# Patient Record
Sex: FEMALE | Race: BLACK OR AFRICAN AMERICAN | NOT HISPANIC OR LATINO | Employment: FULL TIME | URBAN - METROPOLITAN AREA
[De-identification: names, ages, dates, MRNs, and addresses within clinical notes are randomized per-mention and may not be internally consistent; named-entity substitution may affect disease eponyms.]

---

## 2023-09-11 ENCOUNTER — HOSPITAL ENCOUNTER (INPATIENT)
Facility: HOSPITAL | Age: 28
LOS: 2 days | Discharge: HOME | End: 2023-09-13
Attending: EMERGENCY MEDICINE | Admitting: STUDENT IN AN ORGANIZED HEALTH CARE EDUCATION/TRAINING PROGRAM
Payer: COMMERCIAL

## 2023-09-11 ENCOUNTER — APPOINTMENT (EMERGENCY)
Dept: CT IMAGING | Facility: HOSPITAL | Age: 28
End: 2023-09-11
Payer: COMMERCIAL

## 2023-09-11 ENCOUNTER — APPOINTMENT (EMERGENCY)
Dept: RADIOLOGY | Facility: HOSPITAL | Age: 28
End: 2023-09-11
Payer: COMMERCIAL

## 2023-09-11 DIAGNOSIS — R31.0 HEMATURIA, GROSS: Primary | ICD-10-CM

## 2023-09-11 DIAGNOSIS — N39.0 UTI (URINARY TRACT INFECTION): ICD-10-CM

## 2023-09-11 DIAGNOSIS — R10.9 ABDOMINAL PAIN: ICD-10-CM

## 2023-09-11 DIAGNOSIS — K59.00 CONSTIPATION: ICD-10-CM

## 2023-09-11 DIAGNOSIS — R14.0 ABDOMINAL DISTENTION: ICD-10-CM

## 2023-09-11 LAB
ALBUMIN SERPL BCP-MCNC: 4.4 G/DL (ref 3.5–5)
ALP SERPL-CCNC: 57 U/L (ref 34–104)
ALT SERPL W P-5'-P-CCNC: 11 U/L (ref 7–52)
ANION GAP SERPL CALCULATED.3IONS-SCNC: 8 MMOL/L
AST SERPL W P-5'-P-CCNC: 12 U/L (ref 13–39)
BACTERIA UR QL AUTO: ABNORMAL /HPF
BASOPHILS # BLD AUTO: 0.02 THOUSANDS/ÂΜL (ref 0–0.1)
BASOPHILS NFR BLD AUTO: 0 % (ref 0–1)
BILIRUB SERPL-MCNC: 0.48 MG/DL (ref 0.2–1)
BILIRUB UR QL STRIP: NEGATIVE
BUN SERPL-MCNC: 11 MG/DL (ref 5–25)
CALCIUM SERPL-MCNC: 9.7 MG/DL (ref 8.4–10.2)
CHLORIDE SERPL-SCNC: 106 MMOL/L (ref 96–108)
CLARITY UR: ABNORMAL
CO2 SERPL-SCNC: 24 MMOL/L (ref 21–32)
COLOR UR: ABNORMAL
CREAT SERPL-MCNC: 0.52 MG/DL (ref 0.6–1.3)
EOSINOPHIL # BLD AUTO: 0.02 THOUSAND/ÂΜL (ref 0–0.61)
EOSINOPHIL NFR BLD AUTO: 0 % (ref 0–6)
ERYTHROCYTE [DISTWIDTH] IN BLOOD BY AUTOMATED COUNT: 12.6 % (ref 11.6–15.1)
EXT PREGNANCY TEST URINE: NEGATIVE
EXT. CONTROL: NORMAL
GFR SERPL CREATININE-BSD FRML MDRD: 131 ML/MIN/1.73SQ M
GLUCOSE SERPL-MCNC: 90 MG/DL (ref 65–140)
GLUCOSE UR STRIP-MCNC: NEGATIVE MG/DL
HCT VFR BLD AUTO: 38.2 % (ref 34.8–46.1)
HGB BLD-MCNC: 12.8 G/DL (ref 11.5–15.4)
HGB UR QL STRIP.AUTO: ABNORMAL
IMM GRANULOCYTES # BLD AUTO: 0.03 THOUSAND/UL (ref 0–0.2)
IMM GRANULOCYTES NFR BLD AUTO: 0 % (ref 0–2)
KETONES UR STRIP-MCNC: ABNORMAL MG/DL
LEUKOCYTE ESTERASE UR QL STRIP: ABNORMAL
LYMPHOCYTES # BLD AUTO: 1.63 THOUSANDS/ÂΜL (ref 0.6–4.47)
LYMPHOCYTES NFR BLD AUTO: 16 % (ref 14–44)
MCH RBC QN AUTO: 30.3 PG (ref 26.8–34.3)
MCHC RBC AUTO-ENTMCNC: 33.5 G/DL (ref 31.4–37.4)
MCV RBC AUTO: 90 FL (ref 82–98)
MONOCYTES # BLD AUTO: 0.41 THOUSAND/ÂΜL (ref 0.17–1.22)
MONOCYTES NFR BLD AUTO: 4 % (ref 4–12)
NEUTROPHILS # BLD AUTO: 8.27 THOUSANDS/ÂΜL (ref 1.85–7.62)
NEUTS SEG NFR BLD AUTO: 80 % (ref 43–75)
NITRITE UR QL STRIP: NEGATIVE
NON-SQ EPI CELLS URNS QL MICRO: ABNORMAL /HPF
NRBC BLD AUTO-RTO: 0 /100 WBCS
PH UR STRIP.AUTO: 6 [PH]
PLATELET # BLD AUTO: 256 THOUSANDS/UL (ref 149–390)
PMV BLD AUTO: 10.1 FL (ref 8.9–12.7)
POTASSIUM SERPL-SCNC: 3.9 MMOL/L (ref 3.5–5.3)
PROT SERPL-MCNC: 7 G/DL (ref 6.4–8.4)
PROT UR STRIP-MCNC: ABNORMAL MG/DL
RBC # BLD AUTO: 4.23 MILLION/UL (ref 3.81–5.12)
RBC #/AREA URNS AUTO: ABNORMAL /HPF
SODIUM SERPL-SCNC: 138 MMOL/L (ref 135–147)
SP GR UR STRIP.AUTO: 1.01 (ref 1–1.03)
UROBILINOGEN UR STRIP-ACNC: <2 MG/DL
WBC # BLD AUTO: 10.38 THOUSAND/UL (ref 4.31–10.16)
WBC #/AREA URNS AUTO: ABNORMAL /HPF

## 2023-09-11 PROCEDURE — 96375 TX/PRO/DX INJ NEW DRUG ADDON: CPT

## 2023-09-11 PROCEDURE — 99223 1ST HOSP IP/OBS HIGH 75: CPT

## 2023-09-11 PROCEDURE — 83605 ASSAY OF LACTIC ACID: CPT | Performed by: EMERGENCY MEDICINE

## 2023-09-11 PROCEDURE — 80053 COMPREHEN METABOLIC PANEL: CPT | Performed by: EMERGENCY MEDICINE

## 2023-09-11 PROCEDURE — 85025 COMPLETE CBC W/AUTO DIFF WBC: CPT | Performed by: EMERGENCY MEDICINE

## 2023-09-11 PROCEDURE — 87147 CULTURE TYPE IMMUNOLOGIC: CPT | Performed by: EMERGENCY MEDICINE

## 2023-09-11 PROCEDURE — 81025 URINE PREGNANCY TEST: CPT | Performed by: EMERGENCY MEDICINE

## 2023-09-11 PROCEDURE — 74176 CT ABD & PELVIS W/O CONTRAST: CPT

## 2023-09-11 PROCEDURE — 74018 RADEX ABDOMEN 1 VIEW: CPT

## 2023-09-11 PROCEDURE — 96361 HYDRATE IV INFUSION ADD-ON: CPT

## 2023-09-11 PROCEDURE — 87077 CULTURE AEROBIC IDENTIFY: CPT | Performed by: EMERGENCY MEDICINE

## 2023-09-11 PROCEDURE — 81001 URINALYSIS AUTO W/SCOPE: CPT | Performed by: EMERGENCY MEDICINE

## 2023-09-11 PROCEDURE — 96374 THER/PROPH/DIAG INJ IV PUSH: CPT

## 2023-09-11 PROCEDURE — 96376 TX/PRO/DX INJ SAME DRUG ADON: CPT

## 2023-09-11 PROCEDURE — 74177 CT ABD & PELVIS W/CONTRAST: CPT

## 2023-09-11 PROCEDURE — 36415 COLL VENOUS BLD VENIPUNCTURE: CPT | Performed by: EMERGENCY MEDICINE

## 2023-09-11 PROCEDURE — 87086 URINE CULTURE/COLONY COUNT: CPT | Performed by: EMERGENCY MEDICINE

## 2023-09-11 PROCEDURE — 99285 EMERGENCY DEPT VISIT HI MDM: CPT | Performed by: EMERGENCY MEDICINE

## 2023-09-11 PROCEDURE — 87186 SC STD MICRODIL/AGAR DIL: CPT | Performed by: EMERGENCY MEDICINE

## 2023-09-11 PROCEDURE — 99284 EMERGENCY DEPT VISIT MOD MDM: CPT

## 2023-09-11 RX ORDER — ONDANSETRON 2 MG/ML
4 INJECTION INTRAMUSCULAR; INTRAVENOUS ONCE
Status: COMPLETED | OUTPATIENT
Start: 2023-09-11 | End: 2023-09-11

## 2023-09-11 RX ORDER — HYDROMORPHONE HCL/PF 1 MG/ML
0.5 SYRINGE (ML) INJECTION ONCE
Status: COMPLETED | OUTPATIENT
Start: 2023-09-11 | End: 2023-09-11

## 2023-09-11 RX ORDER — CEFTRIAXONE 1 G/50ML
1000 INJECTION, SOLUTION INTRAVENOUS EVERY 24 HOURS
Status: DISCONTINUED | OUTPATIENT
Start: 2023-09-12 | End: 2023-09-13 | Stop reason: HOSPADM

## 2023-09-11 RX ORDER — KETOROLAC TROMETHAMINE 30 MG/ML
30 INJECTION, SOLUTION INTRAMUSCULAR; INTRAVENOUS ONCE
Status: COMPLETED | OUTPATIENT
Start: 2023-09-11 | End: 2023-09-11

## 2023-09-11 RX ORDER — MORPHINE SULFATE 10 MG/ML
6 INJECTION, SOLUTION INTRAMUSCULAR; INTRAVENOUS ONCE
Status: COMPLETED | OUTPATIENT
Start: 2023-09-11 | End: 2023-09-11

## 2023-09-11 RX ADMIN — IOHEXOL 100 ML: 350 INJECTION, SOLUTION INTRAVENOUS at 23:37

## 2023-09-11 RX ADMIN — SODIUM CHLORIDE 1000 ML: 0.9 INJECTION, SOLUTION INTRAVENOUS at 22:23

## 2023-09-11 RX ADMIN — ONDANSETRON 4 MG: 2 INJECTION INTRAMUSCULAR; INTRAVENOUS at 20:16

## 2023-09-11 RX ADMIN — SODIUM CHLORIDE 1000 ML: 0.9 INJECTION, SOLUTION INTRAVENOUS at 18:53

## 2023-09-11 RX ADMIN — HYDROCORTISONE SODIUM SUCCINATE 100 MG: 100 INJECTION, POWDER, FOR SOLUTION INTRAMUSCULAR; INTRAVENOUS at 18:46

## 2023-09-11 RX ADMIN — MORPHINE SULFATE 6 MG: 10 INJECTION INTRAVENOUS at 20:16

## 2023-09-11 RX ADMIN — KETOROLAC TROMETHAMINE 30 MG: 30 INJECTION, SOLUTION INTRAMUSCULAR; INTRAVENOUS at 22:12

## 2023-09-11 RX ADMIN — KETOROLAC TROMETHAMINE 30 MG: 30 INJECTION, SOLUTION INTRAMUSCULAR; INTRAVENOUS at 18:46

## 2023-09-11 RX ADMIN — HYDROMORPHONE HYDROCHLORIDE 0.5 MG: 1 INJECTION, SOLUTION INTRAMUSCULAR; INTRAVENOUS; SUBCUTANEOUS at 22:12

## 2023-09-12 ENCOUNTER — APPOINTMENT (INPATIENT)
Dept: CT IMAGING | Facility: HOSPITAL | Age: 28
End: 2023-09-12
Payer: COMMERCIAL

## 2023-09-12 PROBLEM — I48.91 A-FIB (HCC): Status: ACTIVE | Noted: 2023-09-12

## 2023-09-12 PROBLEM — R14.0 ABDOMINAL DISTENTION: Status: ACTIVE | Noted: 2023-09-12

## 2023-09-12 LAB
ANION GAP SERPL CALCULATED.3IONS-SCNC: 5 MMOL/L
BACTERIA UR QL AUTO: ABNORMAL /HPF
BASOPHILS # BLD AUTO: 0.02 THOUSANDS/ÂΜL (ref 0–0.1)
BASOPHILS NFR BLD AUTO: 0 % (ref 0–1)
BILIRUB UR QL STRIP: NEGATIVE
BUN SERPL-MCNC: 10 MG/DL (ref 5–25)
CALCIUM SERPL-MCNC: 9.2 MG/DL (ref 8.4–10.2)
CHLORIDE SERPL-SCNC: 107 MMOL/L (ref 96–108)
CLARITY UR: CLEAR
CO2 SERPL-SCNC: 26 MMOL/L (ref 21–32)
COLOR UR: COLORLESS
CREAT SERPL-MCNC: 0.48 MG/DL (ref 0.6–1.3)
EOSINOPHIL # BLD AUTO: 0 THOUSAND/ÂΜL (ref 0–0.61)
EOSINOPHIL NFR BLD AUTO: 0 % (ref 0–6)
ERYTHROCYTE [DISTWIDTH] IN BLOOD BY AUTOMATED COUNT: 12.5 % (ref 11.6–15.1)
GFR SERPL CREATININE-BSD FRML MDRD: 134 ML/MIN/1.73SQ M
GLUCOSE SERPL-MCNC: 132 MG/DL (ref 65–140)
GLUCOSE SERPL-MCNC: 142 MG/DL (ref 65–140)
GLUCOSE UR STRIP-MCNC: NEGATIVE MG/DL
HCT VFR BLD AUTO: 40.3 % (ref 34.8–46.1)
HGB BLD-MCNC: 13.2 G/DL (ref 11.5–15.4)
HGB UR QL STRIP.AUTO: ABNORMAL
IMM GRANULOCYTES # BLD AUTO: 0.07 THOUSAND/UL (ref 0–0.2)
IMM GRANULOCYTES NFR BLD AUTO: 1 % (ref 0–2)
KETONES UR STRIP-MCNC: ABNORMAL MG/DL
LACTATE SERPL-SCNC: 0.4 MMOL/L (ref 0.5–2)
LEUKOCYTE ESTERASE UR QL STRIP: NEGATIVE
LYMPHOCYTES # BLD AUTO: 1.27 THOUSANDS/ÂΜL (ref 0.6–4.47)
LYMPHOCYTES NFR BLD AUTO: 10 % (ref 14–44)
MCH RBC QN AUTO: 29.9 PG (ref 26.8–34.3)
MCHC RBC AUTO-ENTMCNC: 32.8 G/DL (ref 31.4–37.4)
MCV RBC AUTO: 91 FL (ref 82–98)
MONOCYTES # BLD AUTO: 0.39 THOUSAND/ÂΜL (ref 0.17–1.22)
MONOCYTES NFR BLD AUTO: 3 % (ref 4–12)
NEUTROPHILS # BLD AUTO: 11.3 THOUSANDS/ÂΜL (ref 1.85–7.62)
NEUTS SEG NFR BLD AUTO: 86 % (ref 43–75)
NITRITE UR QL STRIP: POSITIVE
NON-SQ EPI CELLS URNS QL MICRO: ABNORMAL /HPF
NRBC BLD AUTO-RTO: 0 /100 WBCS
PH UR STRIP.AUTO: 6.5 [PH]
PLATELET # BLD AUTO: 262 THOUSANDS/UL (ref 149–390)
PMV BLD AUTO: 9.8 FL (ref 8.9–12.7)
POTASSIUM SERPL-SCNC: 3.9 MMOL/L (ref 3.5–5.3)
PROT UR STRIP-MCNC: NEGATIVE MG/DL
RBC # BLD AUTO: 4.41 MILLION/UL (ref 3.81–5.12)
RBC #/AREA URNS AUTO: ABNORMAL /HPF
SODIUM SERPL-SCNC: 138 MMOL/L (ref 135–147)
SP GR UR STRIP.AUTO: >=1.05 (ref 1–1.03)
UROBILINOGEN UR STRIP-ACNC: <2 MG/DL
WBC # BLD AUTO: 13.05 THOUSAND/UL (ref 4.31–10.16)
WBC #/AREA URNS AUTO: ABNORMAL /HPF

## 2023-09-12 PROCEDURE — 85025 COMPLETE CBC W/AUTO DIFF WBC: CPT

## 2023-09-12 PROCEDURE — 74176 CT ABD & PELVIS W/O CONTRAST: CPT

## 2023-09-12 PROCEDURE — G1004 CDSM NDSC: HCPCS

## 2023-09-12 PROCEDURE — 80048 BASIC METABOLIC PNL TOTAL CA: CPT

## 2023-09-12 PROCEDURE — 36415 COLL VENOUS BLD VENIPUNCTURE: CPT

## 2023-09-12 PROCEDURE — 99222 1ST HOSP IP/OBS MODERATE 55: CPT | Performed by: PHYSICIAN ASSISTANT

## 2023-09-12 PROCEDURE — 99232 SBSQ HOSP IP/OBS MODERATE 35: CPT | Performed by: INTERNAL MEDICINE

## 2023-09-12 PROCEDURE — 99254 IP/OBS CNSLTJ NEW/EST MOD 60: CPT | Performed by: UROLOGY

## 2023-09-12 PROCEDURE — 87040 BLOOD CULTURE FOR BACTERIA: CPT

## 2023-09-12 PROCEDURE — 82948 REAGENT STRIP/BLOOD GLUCOSE: CPT

## 2023-09-12 PROCEDURE — 81001 URINALYSIS AUTO W/SCOPE: CPT

## 2023-09-12 RX ORDER — LIDOCAINE 50 MG/G
1 PATCH TOPICAL DAILY
Status: DISCONTINUED | OUTPATIENT
Start: 2023-09-12 | End: 2023-09-13 | Stop reason: HOSPADM

## 2023-09-12 RX ORDER — BUDESONIDE AND FORMOTEROL FUMARATE DIHYDRATE 160; 4.5 UG/1; UG/1
2 AEROSOL RESPIRATORY (INHALATION) 2 TIMES DAILY
Status: DISCONTINUED | OUTPATIENT
Start: 2023-09-12 | End: 2023-09-13 | Stop reason: HOSPADM

## 2023-09-12 RX ORDER — AMOXICILLIN 250 MG
1 CAPSULE ORAL
Status: DISCONTINUED | OUTPATIENT
Start: 2023-09-12 | End: 2023-09-13 | Stop reason: HOSPADM

## 2023-09-12 RX ORDER — FERROUS SULFATE 325(65) MG
65 TABLET ORAL
COMMUNITY

## 2023-09-12 RX ORDER — MONTELUKAST SODIUM 10 MG/1
10 TABLET ORAL DAILY
COMMUNITY

## 2023-09-12 RX ORDER — POTASSIUM CHLORIDE 750 MG/1
40 CAPSULE, EXTENDED RELEASE ORAL 2 TIMES DAILY
COMMUNITY

## 2023-09-12 RX ORDER — HYDROMORPHONE HCL/PF 1 MG/ML
1 SYRINGE (ML) INJECTION ONCE
Status: COMPLETED | OUTPATIENT
Start: 2023-09-12 | End: 2023-09-12

## 2023-09-12 RX ORDER — HYDROMORPHONE HCL/PF 1 MG/ML
0.5 SYRINGE (ML) INJECTION EVERY 4 HOURS PRN
Status: DISCONTINUED | OUTPATIENT
Start: 2023-09-12 | End: 2023-09-13 | Stop reason: HOSPADM

## 2023-09-12 RX ORDER — ACETAMINOPHEN 325 MG/1
650 TABLET ORAL EVERY 6 HOURS PRN
Status: DISCONTINUED | OUTPATIENT
Start: 2023-09-12 | End: 2023-09-13 | Stop reason: HOSPADM

## 2023-09-12 RX ORDER — SODIUM CHLORIDE 9 MG/ML
100 INJECTION, SOLUTION INTRAVENOUS CONTINUOUS
Status: DISCONTINUED | OUTPATIENT
Start: 2023-09-12 | End: 2023-09-13 | Stop reason: HOSPADM

## 2023-09-12 RX ORDER — DEXAMETHASONE SODIUM PHOSPHATE 4 MG/ML
4 INJECTION, SOLUTION INTRA-ARTICULAR; INTRALESIONAL; INTRAMUSCULAR; INTRAVENOUS; SOFT TISSUE
COMMUNITY
Start: 2023-08-28 | End: 2023-09-13

## 2023-09-12 RX ORDER — MONTELUKAST SODIUM 10 MG/1
10 TABLET ORAL
Status: DISCONTINUED | OUTPATIENT
Start: 2023-09-12 | End: 2023-09-13 | Stop reason: HOSPADM

## 2023-09-12 RX ORDER — PROMETHAZINE HYDROCHLORIDE 25 MG/ML
25 INJECTION, SOLUTION INTRAMUSCULAR; INTRAVENOUS EVERY 6 HOURS PRN
Status: DISCONTINUED | OUTPATIENT
Start: 2023-09-12 | End: 2023-09-13 | Stop reason: HOSPADM

## 2023-09-12 RX ORDER — SYRINGE, DISPOSABLE, 1 ML
SYRINGE, EMPTY DISPOSABLE MISCELLANEOUS
COMMUNITY
Start: 2023-05-25

## 2023-09-12 RX ORDER — HYDROMORPHONE HCL/PF 1 MG/ML
1 SYRINGE (ML) INJECTION EVERY 4 HOURS PRN
Status: DISCONTINUED | OUTPATIENT
Start: 2023-09-12 | End: 2023-09-13 | Stop reason: HOSPADM

## 2023-09-12 RX ADMIN — CEFTRIAXONE 1000 MG: 1 INJECTION, SOLUTION INTRAVENOUS at 00:28

## 2023-09-12 RX ADMIN — PROMETHAZINE HYDROCHLORIDE 25 MG: 25 INJECTION INTRAMUSCULAR; INTRAVENOUS at 20:51

## 2023-09-12 RX ADMIN — ACETAMINOPHEN 650 MG: 325 TABLET, FILM COATED ORAL at 12:09

## 2023-09-12 RX ADMIN — HYDROMORPHONE HYDROCHLORIDE 1 MG: 1 INJECTION, SOLUTION INTRAMUSCULAR; INTRAVENOUS; SUBCUTANEOUS at 09:01

## 2023-09-12 RX ADMIN — HYDROCORTISONE SODIUM SUCCINATE 50 MG: 100 INJECTION, POWDER, FOR SOLUTION INTRAMUSCULAR; INTRAVENOUS at 12:09

## 2023-09-12 RX ADMIN — HYDROMORPHONE HYDROCHLORIDE 1 MG: 1 INJECTION, SOLUTION INTRAMUSCULAR; INTRAVENOUS; SUBCUTANEOUS at 18:33

## 2023-09-12 RX ADMIN — METHYLNALTREXONE BROMIDE 12 MG: 12 INJECTION, SOLUTION SUBCUTANEOUS at 14:53

## 2023-09-12 RX ADMIN — BUDESONIDE AND FORMOTEROL FUMARATE DIHYDRATE 2 PUFF: 160; 4.5 AEROSOL RESPIRATORY (INHALATION) at 08:56

## 2023-09-12 RX ADMIN — ACETAMINOPHEN 650 MG: 325 TABLET, FILM COATED ORAL at 19:33

## 2023-09-12 RX ADMIN — HYDROMORPHONE HYDROCHLORIDE 1 MG: 1 INJECTION, SOLUTION INTRAMUSCULAR; INTRAVENOUS; SUBCUTANEOUS at 13:43

## 2023-09-12 RX ADMIN — BUDESONIDE AND FORMOTEROL FUMARATE DIHYDRATE 2 PUFF: 160; 4.5 AEROSOL RESPIRATORY (INHALATION) at 18:37

## 2023-09-12 RX ADMIN — SODIUM CHLORIDE 100 ML/HR: 0.9 INJECTION, SOLUTION INTRAVENOUS at 03:59

## 2023-09-12 RX ADMIN — MONTELUKAST 10 MG: 10 TABLET, FILM COATED ORAL at 01:45

## 2023-09-12 RX ADMIN — LIDOCAINE 1 PATCH: 50 PATCH CUTANEOUS at 08:56

## 2023-09-12 RX ADMIN — HYDROCORTISONE SODIUM SUCCINATE 50 MG: 100 INJECTION, POWDER, FOR SOLUTION INTRAMUSCULAR; INTRAVENOUS at 18:34

## 2023-09-12 RX ADMIN — PROMETHAZINE HYDROCHLORIDE 25 MG: 25 INJECTION INTRAMUSCULAR; INTRAVENOUS at 14:48

## 2023-09-12 RX ADMIN — HYDROCORTISONE SODIUM SUCCINATE 50 MG: 100 INJECTION, POWDER, FOR SOLUTION INTRAMUSCULAR; INTRAVENOUS at 01:46

## 2023-09-12 RX ADMIN — SENNOSIDES AND DOCUSATE SODIUM 1 TABLET: 50; 8.6 TABLET ORAL at 21:01

## 2023-09-12 RX ADMIN — MONTELUKAST 10 MG: 10 TABLET, FILM COATED ORAL at 21:00

## 2023-09-12 RX ADMIN — SODIUM CHLORIDE 100 ML/HR: 0.9 INJECTION, SOLUTION INTRAVENOUS at 12:14

## 2023-09-12 RX ADMIN — SODIUM CHLORIDE 100 ML/HR: 0.9 INJECTION, SOLUTION INTRAVENOUS at 22:40

## 2023-09-12 RX ADMIN — HYDROCORTISONE SODIUM SUCCINATE 50 MG: 100 INJECTION, POWDER, FOR SOLUTION INTRAMUSCULAR; INTRAVENOUS at 08:56

## 2023-09-12 RX ADMIN — HYDROMORPHONE HYDROCHLORIDE 1 MG: 1 INJECTION, SOLUTION INTRAMUSCULAR; INTRAVENOUS; SUBCUTANEOUS at 00:25

## 2023-09-12 RX ADMIN — HYDROMORPHONE HYDROCHLORIDE 0.5 MG: 1 INJECTION, SOLUTION INTRAMUSCULAR; INTRAVENOUS; SUBCUTANEOUS at 04:03

## 2023-09-12 NOTE — ASSESSMENT & PLAN NOTE
· Today began having difficulty urinating and left flank pain  · CT renal study (non-con) and CT abd/pelvis w/contrast showing significant bladder distention, otherwise negative for acute process (no stones, no hydro)  · UA suggestive of possible UTI; not meeting SEPSIS criteria   · Pregnancy negative  · Continue Lee and will be discharged with Lee to follow-up with urology as outpatient  · Likely related to cystitis  · Follow-up urine culture and blood culture  · Continue Rocephin  · Urology following

## 2023-09-12 NOTE — ASSESSMENT & PLAN NOTE
Patient reports dieter blood in her urine for the past two weeks. Today she began to have difficulty urinating and developed severe left flank pain described as "I feel like my kidney exploded" earlier today. She receives most of her care at [facility] and reports prior hx of marcie crisis requiring intubation. Patient is tearful and reports continued ongoing left flank pain at this time.     /60 (BP Location: Left arm)   Pulse 80   Temp (!) 97.3 °F (36.3 °C)   Resp 17   Ht 5' 2" (1.575 m)   Wt 72.6 kg (160 lb 0.9 oz)   SpO2 95%   BMI 29.27 kg/m²     · Reports dieter blood in urine x2weeks  · Today began having difficulty urinating and left flank pain  · Has been able to urinate in the ED without difficulty   · CT renal study (non-con) and CT abd/pelvis w/contrast showing significant bladder distention, otherwise negative for acute process (no stones, no hydro)  · UA suggestive of possible UTI; not meeting SEPSIS criteria   · Pregnancy negative   · No SHARMILA; not meeting SIRS  · Lee catheter to be placed overnight   · IV rocephin ordered   · F/u w/ pending urine cultures  · Urinary retention protocol ordered   · Urology consulted

## 2023-09-12 NOTE — PLAN OF CARE
Problem: PAIN - ADULT  Goal: Verbalizes/displays adequate comfort level or baseline comfort level  Description: Interventions:  - Encourage patient to monitor pain and request assistance  - Assess pain using appropriate pain scale  - Administer analgesics based on type and severity of pain and evaluate response  - Implement non-pharmacological measures as appropriate and evaluate response  - Consider cultural and social influences on pain and pain management  - Notify physician/advanced practitioner if interventions unsuccessful or patient reports new pain  Outcome: Progressing     Problem: INFECTION - ADULT  Goal: Absence or prevention of progression during hospitalization  Description: INTERVENTIONS:  - Assess and monitor for signs and symptoms of infection  - Monitor lab/diagnostic results  - Monitor all insertion sites, i.e. indwelling lines, tubes, and drains  - Monitor endotracheal if appropriate and nasal secretions for changes in amount and color  - [place] appropriate cooling/warming therapies per order  - Administer medications as ordered  - Instruct and encourage patient and family to use good hand hygiene technique  - Identify and instruct in appropriate isolation precautions for identified infection/condition  Outcome: Progressing  Goal: Absence of fever/infection during neutropenic period  Description: INTERVENTIONS:  - Monitor WBC    Outcome: Progressing     Problem: SAFETY ADULT  Goal: Patient will remain free of falls  Description: INTERVENTIONS:  - Educate patient/family on patient safety including physical limitations  - Instruct patient to call for assistance with activity   - Consult OT/PT to assist with strengthening/mobility   - Keep Call bell within reach  - Keep bed low and locked with side rails adjusted as appropriate  - Keep care items and personal belongings within reach  - Initiate and maintain comfort rounds  - Make Fall Risk Sign visible to staff  - Offer Toileting every 3 Hours, in advance of need  - Initiate/Maintain bed/chair alarm  - Obtain necessary fall risk management equipment:   - Apply yellow socks and bracelet for high fall risk patients  - Consider moving patient to room near nurses station  Outcome: Progressing  Goal: Maintain or return to baseline ADL function  Description: INTERVENTIONS:  -  Assess patient's ability to carry out ADLs; assess patient's baseline for ADL function and identify physical deficits which impact ability to perform ADLs (bathing, care of mouth/teeth, toileting, grooming, dressing, etc.)  - Assess/evaluate cause of self-care deficits   - Assess range of motion  - Assess patient's mobility; develop plan if impaired  - Assess patient's need for assistive devices and provide as appropriate  - Encourage maximum independence but intervene and supervise when necessary  - Involve family in performance of ADLs  - Assess for home care needs following discharge   - Consider OT consult to assist with ADL evaluation and planning for discharge  - Provide patient education as appropriate  Outcome: Progressing  Goal: Maintains/Returns to pre admission functional level  Description: INTERVENTIONS:  - Perform BMAT or MOVE assessment daily.   - Set and communicate daily mobility goal to care team and patient/family/caregiver. - Collaborate with rehabilitation services on mobility goals if consulted  - Reposition patient every 2 hours.   - Dangle patient 3 times a day  - Stand patient 3 times a day  - Ambulate patient 3 times a day  - Out of bed to chair 3 times a day   - Out of bed for meals 3 times a day  - Out of bed for toileting  - Record patient progress and toleration of activity level   Outcome: Progressing     Problem: DISCHARGE PLANNING  Goal: Discharge to home or other facility with appropriate resources  Description: INTERVENTIONS:  - Identify barriers to discharge w/patient and caregiver  - Arrange for needed discharge resources and transportation as appropriate  - Identify discharge learning needs (meds, wound care, etc.)  - Arrange for interpretive services to assist at discharge as needed  - Refer to Case Management Department for coordinating discharge planning if the patient needs post-hospital services based on physician/advanced practitioner order or complex needs related to functional status, cognitive ability, or social support system  Outcome: Progressing     Problem: Knowledge Deficit  Goal: Patient/family/caregiver demonstrates understanding of disease process, treatment plan, medications, and discharge instructions  Description: Complete learning assessment and assess knowledge base.   Interventions:  - Provide teaching at level of understanding  - Provide teaching via preferred learning methods  Outcome: Progressing     Problem: GASTROINTESTINAL - ADULT  Goal: Minimal or absence of nausea and/or vomiting  Description: INTERVENTIONS:  - Administer IV fluids if ordered to ensure adequate hydration  - Maintain NPO status until nausea and vomiting are resolved  - Nasogastric tube if ordered  - Administer ordered antiemetic medications as needed  - Provide nonpharmacologic comfort measures as appropriate  - Advance diet as tolerated, if ordered  - Consider nutrition services referral to assist patient with adequate nutrition and appropriate food choices  Outcome: Progressing  Goal: Maintains or returns to baseline bowel function  Description: INTERVENTIONS:  - Assess bowel function  - Encourage oral fluids to ensure adequate hydration  - Administer IV fluids if ordered to ensure adequate hydration  - Administer ordered medications as needed  - Encourage mobilization and activity  - Consider nutritional services referral to assist patient with adequate nutrition and appropriate food choices  Outcome: Progressing  Goal: Maintains adequate nutritional intake  Description: INTERVENTIONS:  - Monitor percentage of each meal consumed  - Identify factors contributing to decreased intake, treat as appropriate  - Assist with meals as needed  - Monitor I&O, weight, and lab values if indicated  - Obtain nutrition services referral as needed  Outcome: Progressing  Goal: Establish and maintain optimal ostomy function  Description: INTERVENTIONS:  - Assess bowel function  - Encourage oral fluids to ensure adequate hydration  - Administer IV fluids if ordered to ensure adequate hydration   - Administer ordered medications as needed  - Encourage mobilization and activity  - Nutrition services referral to assist patient with appropriate food choices  - Assess stoma site  - Consider wound care consult   Outcome: Progressing  Goal: Oral mucous membranes remain intact  Description: INTERVENTIONS  - Assess oral mucosa and hygiene practices  - Implement preventative oral hygiene regimen  - Implement oral medicated treatments as ordered  - Initiate Nutrition services referral as needed  Outcome: Progressing     Problem: GENITOURINARY - ADULT  Goal: Maintains or returns to baseline urinary function  Description: INTERVENTIONS:  - Assess urinary function  - Encourage oral fluids to ensure adequate hydration if ordered  - Administer IV fluids as ordered to ensure adequate hydration  - Administer ordered medications as needed  - Offer frequent toileting  - Follow urinary retention protocol if ordered  Outcome: Progressing  Goal: Absence of urinary retention  Description: INTERVENTIONS:  - Assess patient’s ability to void and empty bladder  - Monitor I/O  - Bladder scan as needed  - Discuss with physician/AP medications to alleviate retention as needed  - Discuss catheterization for long term situations as appropriate  Outcome: Progressing  Goal: Urinary catheter remains patent  Description: INTERVENTIONS:  - Assess patency of urinary catheter  - If patient has a chronic de leon, consider changing catheter if non-functioning  - Follow guidelines for intermittent irrigation of non-functioning urinary catheter  Outcome: Progressing     Problem: METABOLIC, FLUID AND ELECTROLYTES - ADULT  Goal: Electrolytes maintained within normal limits  Description: INTERVENTIONS:  - Monitor labs and assess patient for signs and symptoms of electrolyte imbalances  - Administer electrolyte replacement as ordered  - Monitor response to electrolyte replacements, including repeat lab results as appropriate  - Instruct patient on fluid and nutrition as appropriate  Outcome: Progressing  Goal: Fluid balance maintained  Description: INTERVENTIONS:  - Monitor labs   - Monitor I/O and WT  - Instruct patient on fluid and nutrition as appropriate  - Assess for signs & symptoms of volume excess or deficit  Outcome: Progressing  Goal: Glucose maintained within target range  Description: INTERVENTIONS:  - Monitor Blood Glucose as ordered  - Assess for signs and symptoms of hyperglycemia and hypoglycemia  - Administer ordered medications to maintain glucose within target range  - Assess nutritional intake and initiate nutrition service referral as needed  Outcome: Progressing

## 2023-09-12 NOTE — ASSESSMENT & PLAN NOTE
· Given 100mg IV hydrocortisone in ED   · Will continue stress dose 100mg IV hydrocortisone BID on admission
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
## 2023-09-12 NOTE — ASSESSMENT & PLAN NOTE
· Family reports prior hx of afib  · Was previously on coumadin and later eliquis  · Is no longer on [facility]  · Afib thought to be 2/2 addisons crisis at this time

## 2023-09-12 NOTE — UTILIZATION REVIEW
Initial Clinical Review    Admission: Date/Time/Statement:   Admission Orders (From admission, onward)       Ordered        09/11/23 2351  INPATIENT ADMISSION  Once                          Orders Placed This Encounter   Procedures    INPATIENT ADMISSION     Standing Status:   Standing     Number of Occurrences:   1     Order Specific Question:   Level of Care     Answer:   Med Surg [16]     Order Specific Question:   Estimated length of stay     Answer:   More than 2 Midnights     Order Specific Question:   Certification     Answer:   I certify that inpatient services are medically necessary for this patient for a duration of greater than two midnights. See H&P and MD Progress Notes for additional information about the patient's course of treatment. ED Arrival Information       Expected   -    Arrival   9/11/2023 17:22    Acuity   Emergent              Means of arrival   Walk-In    Escorted by   [facility]    Admission type   Emergency              Arrival complaint   blood in urine             Chief Complaint   Patient presents with    Flank Pain     L sided flank pain starting today, "Feels like my kidney exploded." Unable to void. Pt reports dieter blood in her urine x2 weeks, had UA last week that did not show infection. Pt reports also having addisons and needs 'hydrocortisone in stressful situations.'    Contraception     Reports IUD fell out last week       Initial Presentation: 33 y.o. female to the ED from home with complaints of left sided flank pain, blood in urine. Admitted to inpatient for hematuria, addisons disease, afib. H/O afib, asthma, addisions, sizures. Has had blood in her urine for the past 2 weeks. IUD fell out the week prior. CT a/p  shows no acute abnormalities. No longer on anticoag. Afib could be due to addisons crisis. GIven IV hydrocortisone in the ED. Continue. Urology consult. UA suggests possible UTI. Unclear etiology of hematuria.   Started on IV rocephin. Urine cultures pending. Place de leon    Date: 9/12   Day 2:  Continue with IV rocephin. Continues with flank pain. Urology consult: UA large leukocytes, nitrite negative. Innuemrable bacteria. 10-20 WBC . Urine culture pending. WBCs increased. Continue iv abx. CT with contrast shows:  Stable cortical defects in the right kidney may be secondary to chronic infectious insult. Symmetric nephrographic phase enhancement of the kidneys. left flank pain slightly improved. Reports utis as a child were untreated. Gen/surg consult:  No indication for surgical intervention at this time. Abdomen mildly distended, soft. ED Triage Vitals   Temperature Pulse Respirations Blood Pressure SpO2   09/11/23 1735 09/11/23 1735 09/11/23 1735 09/11/23 1735 09/11/23 1735   (!) 97.3 °F (36.3 °C) 83 20 134/90 99 %      Temp Source Heart Rate Source Patient Position - Orthostatic VS BP Location FiO2 (%)   09/12/23 0048 09/11/23 1735 09/11/23 1735 09/11/23 1735 --   Oral Monitor Sitting Left arm       Pain Score       09/11/23 1735       9          Wt Readings from Last 1 Encounters:   09/11/23 72.6 kg (160 lb 0.9 oz)     Additional Vital Signs:   ate/Time Temp Pulse Resp BP MAP (mmHg) SpO2 O2 Device Patient Position - Orthostatic VS   09/12/23 07:21:41 97.9 °F (36.6 °C) 78 -- 121/57 78 96 % -- --   09/12/23 0300 -- -- -- -- -- 96 % None (Room air) --   09/12/23 00:48:37 97.7 °F (36.5 °C) 89 20 123/79 94 94 % None (Room air) Lying   09/11/23 2048 -- 80 17 127/60 -- 95 % None (Room air) Lying   09/11/23 1735 97.3 °F (36.3 °C) Abnormal  83 20 134/90 106 99 % None (Room air) Si     Pertinent Labs/Diagnostic Test Results:   CT abdomen pelvis with contrast   Final Result by Criss Cheung MD (09/12 0014)      No evidence of acute intra-abdominal or pelvic process.             Workstation performed: [id number]         CT renal stone study abdomen pelvis wo contrast   Final Result by Delfino Jaramillo MD (09/11 2049)      No nephroureterolithiasis. No hydronephrosis. If patient's gross hematuria persists, consider dedicated CT renal mass protocol to assess for underlying abnormal enhancement. No acute abnormality to account for patient's complaint of pain. Workstation performed: [id number]         XR abdomen 1 view kub   Final Result by Alcira Kinney MD (09/12 0302)      No radiopaque urinary tract calculi.          Workstation performed: [id number]               Results from last 7 days   Lab Units 09/12/23  0507 09/11/23  1845   WBC Thousand/uL 13.05* 10.38*   HEMOGLOBIN g/dL 13.2 12.8   HEMATOCRIT % 40.3 38.2   PLATELETS Thousands/uL 262 256   NEUTROS ABS Thousands/µL 11.30* 8.27*         Results from last 7 days   Lab Units 09/12/23  0507 09/11/23  1845   SODIUM mmol/L 138 138   POTASSIUM mmol/L 3.9 3.9   CHLORIDE mmol/L 107 106   CO2 mmol/L 26 24   ANION GAP mmol/L 5 8   BUN mg/dL 10 11   CREATININE mg/dL 0.48* 0.52*   EGFR ml/min/1.73sq m 134 131   CALCIUM mg/dL 9.2 9.7     Results from last 7 days   Lab Units 09/11/23  1845   AST U/L 12*   ALT U/L 11   ALK PHOS U/L 57   TOTAL PROTEIN g/dL 7.0   ALBUMIN g/dL 4.4   TOTAL BILIRUBIN mg/dL 0.48         Results from last 7 days   Lab Units 09/12/23  0507 09/11/23  1845   GLUCOSE RANDOM mg/dL 132 90       Results from last 7 days   Lab Units 09/11/23  2326   LACTIC ACID mmol/L 0.4*       Results from last 7 days   Lab Units 09/12/23  0230 09/11/23  1918   CLARITY UA  Clear Turbid   COLOR UA  Colorless Light Orange   SPEC GRAV UA  >=1.050* 1.007   PH UA  6.5 6.0   GLUCOSE UA mg/dl Negative Negative   KETONES UA mg/dl 60 (2+)* 10 (1+)*   BLOOD UA  Small* Large*   PROTEIN UA mg/dl Negative 30 (1+)*   NITRITE UA  Positive* Negative   BILIRUBIN UA  Negative Negative   UROBILINOGEN UA (BE) mg/dl <2.0 <2.0   LEUKOCYTES UA  Negative Large*   WBC UA /hpf 1-2 10-20*   RBC UA /hpf 2-4* Innumerable*   BACTERIA UA /hpf None Seen Innumerable*   EPITHELIAL CELLS WET PREP /hpf Occasional Innumerable*     Results from last 7 days   Lab Units 09/12/23  0012   BLOOD CULTURE  Received in Microbiology Lab. Culture in Progress. Received in Microbiology Lab. Culture in Progress.        ED Treatment:   Medication Administration from 09/11/2023 1722 to 09/12/2023 0044         Date/Time Order Dose Route Action Comments     09/11/2023 1846 EDT ketorolac (TORADOL) injection 30 mg 30 mg Intravenous Given --     09/11/2023 1846 EDT hydrocortisone (Solu-CORTEF) injection 100 mg 100 mg Intravenous Given --     09/11/2023 1853 EDT sodium chloride 0.9 % bolus 1,000 mL 1,000 mL Intravenous New Bag --     09/11/2023 2016 EDT morphine injection 6 mg 6 mg Intravenous Given --     09/11/2023 2016 EDT ondansetron (ZOFRAN) injection 4 mg 4 mg Intravenous Given --     09/11/2023 2212 EDT ketorolac (TORADOL) injection 30 mg 30 mg Intravenous Given --     09/11/2023 2223 EDT sodium chloride 0.9 % bolus 1,000 mL 1,000 mL Intravenous New Bag --     09/11/2023 2212 EDT HYDROmorphone (DILAUDID) injection 0.5 mg 0.5 mg Intravenous Given --     09/12/2023 0028 EDT cefTRIAXone (ROCEPHIN) IVPB (premix in dextrose) 1,000 mg 50 mL 1,000 mg Intravenous New Bag --     09/12/2023 0025 EDT HYDROmorphone (DILAUDID) injection 1 mg 1 mg Intravenous Given --          Past Medical History:   Diagnosis Date    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 female  Admission Orders:  UP and OOB  REgular diet  Scheduled Medications:  budesonide-formoterol, 2 puff, Inhalation, BID  cefTRIAXone, 1,000 mg, Intravenous, Q24H  hydrocortisone sodium succinate, 50 mg, Intravenous, Q6H LINDA  lidocaine, 1 patch, Topical, Daily  montelukast, 10 mg, Oral, HS      Continuous IV Infusions:  sodium chloride, 100 mL/hr, Intravenous, Continuous      PRN Meds:  acetaminophen, 650 mg, Oral, Q6H PRN  HYDROmorphone, 0.5 mg, Intravenous, Q4H PRN  HYDROmorphone, 1 mg, Intravenous, Q4H PRN x2 9/12        IP CONSULT TO UROLOGY    Network Utilization Review Department  ATTENTION: Please call with any questions or concerns to [phone] and carefully listen to the prompts so that you are directed to the right person. All voicemails are confidential.  [facility] [facility] all requests for admission clinical reviews, approved or denied determinations and any other requests to dedicated fax number below belonging to the campus where the patient is receiving treatment.  List of dedicated fax numbers for the Facilities:  [place] DENIALS (Administrative/Medical Necessity) [phone] [address] [facility] (Maternity/NICU/Pediatrics) [phone]   [address] [place] [address] Drive [phone]   [facility] [address] [facility] [phone]   [address] [place] [address] [address] [place] [address] [phone]   [address] [place] [address] [phone]

## 2023-09-12 NOTE — PLAN OF CARE
Problem: PAIN - ADULT  Goal: Verbalizes/displays adequate comfort level or baseline comfort level  Description: Interventions:  - Encourage patient to monitor pain and request assistance  - Assess pain using appropriate pain scale  - Administer analgesics based on type and severity of pain and evaluate response  - Implement non-pharmacological measures as appropriate and evaluate response  - Consider cultural and social influences on pain and pain management  - Notify physician/advanced practitioner if interventions unsuccessful or patient reports new pain  Outcome: Progressing     Problem: INFECTION - ADULT  Goal: Absence or prevention of progression during hospitalization  Description: INTERVENTIONS:  - Assess and monitor for signs and symptoms of infection  - Monitor lab/diagnostic results  - Monitor all insertion sites, i.e. indwelling lines, tubes, and drains  - Monitor endotracheal if appropriate and nasal secretions for changes in amount and color  - [place] appropriate cooling/warming therapies per order  - Administer medications as ordered  - Instruct and encourage patient and family to use good hand hygiene technique  - Identify and instruct in appropriate isolation precautions for identified infection/condition  Outcome: Progressing  Goal: Absence of fever/infection during neutropenic period  Description: INTERVENTIONS:  - Monitor WBC    Outcome: Progressing     Problem: Knowledge Deficit  Goal: Patient/family/caregiver demonstrates understanding of disease process, treatment plan, medications, and discharge instructions  Description: Complete learning assessment and assess knowledge base.   Interventions:  - Provide teaching at level of understanding  - Provide teaching via preferred learning methods  Outcome: Progressing     Problem: GASTROINTESTINAL - ADULT  Goal: Minimal or absence of nausea and/or vomiting  Description: INTERVENTIONS:  - Administer IV fluids if ordered to ensure adequate hydration  - Maintain NPO status until nausea and vomiting are resolved  - Nasogastric tube if ordered  - Administer ordered antiemetic medications as needed  - Provide nonpharmacologic comfort measures as appropriate  - Advance diet as tolerated, if ordered  - Consider nutrition services referral to assist patient with adequate nutrition and appropriate food choices  Outcome: Progressing  Goal: Maintains or returns to baseline bowel function  Description: INTERVENTIONS:  - Assess bowel function  - Encourage oral fluids to ensure adequate hydration  - Administer IV fluids if ordered to ensure adequate hydration  - Administer ordered medications as needed  - Encourage mobilization and activity  - Consider nutritional services referral to assist patient with adequate nutrition and appropriate food choices  Outcome: Progressing  Goal: Maintains adequate nutritional intake  Description: INTERVENTIONS:  - Monitor percentage of each meal consumed  - Identify factors contributing to decreased intake, treat as appropriate  - Assist with meals as needed  - Monitor I&O, weight, and lab values if indicated  - Obtain nutrition services referral as needed  Outcome: Progressing  Goal: Establish and maintain optimal ostomy function  Description: INTERVENTIONS:  - Assess bowel function  - Encourage oral fluids to ensure adequate hydration  - Administer IV fluids if ordered to ensure adequate hydration   - Administer ordered medications as needed  - Encourage mobilization and activity  - Nutrition services referral to assist patient with appropriate food choices  - Assess stoma site  - Consider wound care consult   Outcome: Progressing  Goal: Oral mucous membranes remain intact  Description: INTERVENTIONS  - Assess oral mucosa and hygiene practices  - Implement preventative oral hygiene regimen  - Implement oral medicated treatments as ordered  - Initiate Nutrition services referral as needed  Outcome: Progressing     Problem: GENITOURINARY - ADULT  Goal: Maintains or returns to baseline urinary function  Description: INTERVENTIONS:  - Assess urinary function  - Encourage oral fluids to ensure adequate hydration if ordered  - Administer IV fluids as ordered to ensure adequate hydration  - Administer ordered medications as needed  - Offer frequent toileting  - Follow urinary retention protocol if ordered  Outcome: Progressing  Goal: Absence of urinary retention  Description: INTERVENTIONS:  - Assess patient’s ability to void and empty bladder  - Monitor I/O  - Bladder scan as needed  - Discuss with physician/AP medications to alleviate retention as needed  - Discuss catheterization for long term situations as appropriate  Outcome: Progressing  Goal: Urinary catheter remains patent  Description: INTERVENTIONS:  - Assess patency of urinary catheter  - If patient has a chronic de leon, consider changing catheter if non-functioning  - Follow guidelines for intermittent irrigation of non-functioning urinary catheter  Outcome: Progressing     Problem: DISCHARGE PLANNING  Goal: Discharge to home or other facility with appropriate resources  Description: INTERVENTIONS:  - Identify barriers to discharge w/patient and caregiver  - Arrange for needed discharge resources and transportation as appropriate  - Identify discharge learning needs (meds, wound care, etc.)  - Arrange for interpretive services to assist at discharge as needed  - Refer to Case Management Department for coordinating discharge planning if the patient needs post-hospital services based on physician/advanced practitioner order or complex needs related to functional status, cognitive ability, or social support system  Outcome: Progressing     Problem: GENITOURINARY - ADULT  Goal: Maintains or returns to baseline urinary function  Description: INTERVENTIONS:  - Assess urinary function  - Encourage oral fluids to ensure adequate hydration if ordered  - Administer IV fluids as ordered to ensure adequate hydration  - Administer ordered medications as needed  - Offer frequent toileting  - Follow urinary retention protocol if ordered  Outcome: Progressing  Goal: Absence of urinary retention  Description: INTERVENTIONS:  - Assess patient’s ability to void and empty bladder  - Monitor I/O  - Bladder scan as needed  - Discuss with physician/AP medications to alleviate retention as needed  - Discuss catheterization for long term situations as appropriate  Outcome: Progressing  Goal: Urinary catheter remains patent  Description: INTERVENTIONS:  - Assess patency of urinary catheter  - If patient has a chronic de leon, consider changing catheter if non-functioning  - Follow guidelines for intermittent irrigation of non-functioning urinary catheter  Outcome: Progressing     Problem: METABOLIC, FLUID AND ELECTROLYTES - ADULT  Goal: Electrolytes maintained within normal limits  Description: INTERVENTIONS:  - Monitor labs and assess patient for signs and symptoms of electrolyte imbalances  - Administer electrolyte replacement as ordered  - Monitor response to electrolyte replacements, including repeat lab results as appropriate  - Instruct patient on fluid and nutrition as appropriate  Outcome: Progressing  Goal: Fluid balance maintained  Description: INTERVENTIONS:  - Monitor labs   - Monitor I/O and WT  - Instruct patient on fluid and nutrition as appropriate  - Assess for signs & symptoms of volume excess or deficit  Outcome: Progressing  Goal: Glucose maintained within target range  Description: INTERVENTIONS:  - Monitor Blood Glucose as ordered  - Assess for signs and symptoms of hyperglycemia and hypoglycemia  - Administer ordered medications to maintain glucose within target range  - Assess nutritional intake and initiate nutrition service referral as needed  Outcome: Progressing

## 2023-09-12 NOTE — ASSESSMENT & PLAN NOTE
· Does not appear to be in acute exacerbation  · Continue prehospital inhalers   · Monitor respiratory status

## 2023-09-12 NOTE — ASSESSMENT & PLAN NOTE
· Family reports prior hx of afib  · Was previously on coumadin and later eliquis  · Is no longer on [facility]  · Afib thought to be 2/2 addisons crisis at this time

## 2023-09-12 NOTE — QUICK NOTE
Per nursing patient's pain has improved somewhat, de leon catheter has been placed and is draining clear,  pale yellow urine.

## 2023-09-12 NOTE — CONSULTS
Consult- General Surgery   Argelia Hawthorneust 33 y.o. female MRN: [id number]  Unit/Bed#: -01 Encounter: [id number]      Assessment/Plan     Kira Malik is a 33 y.o. female    Tellis Hoose hematuria  General surgery consulted for abdominal pain and distension  CT of the abdomen and pelvis obtained, awaiting final read, wet read shows mild colonic distension secondary to stool burden consistent with constipation   CT of the abdomen and pelvis obtained 9/11 showed no evidence of acute intraabdominal or pelvic process    No indication for surgical intervention, suspect abdominal pain and bloating secondary to constipation given significant stool burden noted on imaging. No evidence of an acute surgical issue on imaging and abdominal exam remains benign. Abdomen is mildly distended but soft, no guarding or rebound, no peritoneal signs. Recommend potential GI evaluation and initiation of bowel regimen   Serial abdominal exam  Pain control/antiemetics PRN  SLIM primary, General surgery will sign off at this time. Please contact if any further questions or concerns arise       History of Present Illness     HPI:  Kira Malik is a 33 y.o. female who presents with acute abdominal pain and distension. The patient is currently admitted for dieter hematuria. Per patient, she developed severe flank pain and dieter hematuria yesterday which prompted her to seek medical attention. CT of the abdomen and pelvis obtained at time did not show any acute findings but bladder was noted to be markedly distended. Lee catheter was placed and patient was admitted with Urology consult. The following day, patient noted worsening abdominal pain and cramping with distension while admitted. Repeat CT of the abdomen and pelvis showed similar findings, but noted increasing stool burden. At this time, the patient states her abdomen is still bloated but states the pain has improved.  She denies any fevers, chills, nausea, emesis. She states she is still passing flatus and did have a bowel movement today. She states she does typically have a bowel movement daily. She denies any prior surgical history. She states she did have a normal colonoscopy many years ago. Review of Systems   Constitutional: Negative for activity change, appetite change, fatigue, fever and unexpected weight change. HENT: Negative for congestion, tinnitus and voice change. Eyes: Negative for photophobia, discharge and visual disturbance. Respiratory: Negative for apnea, chest tightness, shortness of breath, wheezing and stridor. Cardiovascular: Negative for chest pain and leg swelling. Gastrointestinal: Positive for abdominal distention and abdominal pain. Negative for constipation, nausea and rectal pain. Endocrine: Negative for cold intolerance, polydipsia, polyphagia and polyuria. Genitourinary: Positive for flank pain and hematuria. Negative for decreased urine volume, difficulty urinating, dysuria, pelvic pain and urgency. Musculoskeletal: Negative for back pain, neck pain and neck stiffness. Skin: Negative for color change, pallor, rash and wound. Neurological: Negative for dizziness, tremors, syncope and weakness. 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 No pertinent family history.     Meds/Allergies   PTA meds:   Prior to Admission Medications   Prescriptions Last Dose Informant Patient Reported? Taking?    Cholecalciferol 100 MCG (4000 UT) TABS   Yes No   Sig: Take 4,000 Units by mouth daily   TUBERCULIN SYR 1CC/25GX5/8" (BD Syringe Slip Tip) 25G X 5/8" 1 ML MISC   Yes Yes   Sig: USE AS DIRECTED WITH DEXAMETHASONE FOR ADRENAL CRISIS   albuterol (5 mg/mL) 0.5 % nebulizer solution   Yes No   Sig: Inhale 2.5 mg   dexamethasone (DECADRON) 4 mg/mL   Yes Yes   Sig: Inject 4 mg into a catheter in a vein   ferrous sulfate 325 (65 Fe) mg tablet   Yes No   Sig: Take 65 mg by mouth   montelukast (SINGULAIR) 10 mg tablet   Yes No   Sig: Take 10 mg by mouth daily   potassium chloride (MICRO-K) 10 MEQ CR capsule   Yes No   Sig: Take 40 mEq by mouth 2 (two) times a day      Facility-Administered Medications: None     Allergies   Allergen Reactions   • Erythromycin Rash       Objective   First Vitals:   Blood Pressure: 134/90 (09/11/23 1735)  Pulse: 83 (09/11/23 1735)  Temperature: (!) 97.3 °F (36.3 °C) (09/11/23 1735)  Temp Source: Oral (09/12/23 0048)  Respirations: 20 (09/11/23 1735)  Height: 5' 2" (157.5 cm) (09/11/23 1734)  Weight - Scale: 72.6 kg (160 lb 0.9 oz) (09/11/23 1734)  SpO2: 99 % (09/11/23 1735)    Current Vitals:   Blood Pressure: 151/86 (09/12/23 1335)  Pulse: (!) 109 (09/12/23 1335)  Temperature: 97.9 °F (36.6 °C) (09/12/23 1335)  Temp Source: Oral (09/12/23 0048)  Respirations: 20 (09/12/23 0048)  Height: 5' 2" (157.5 cm) (09/11/23 1734)  Weight - Scale: 72.6 kg (160 lb 0.9 oz) (09/11/23 1734)  SpO2: 98 % (09/12/23 1335)      Intake/Output Summary (Last 24 hours) at 9/12/2023 1453  Last data filed at 9/12/2023 0401  Gross per 24 hour   Intake 3360 ml   Output 700 ml   Net 2660 ml       Invasive Devices     Peripheral Intravenous Line  Duration           Peripheral IV 09/11/23 Right Antecubital <1 day          Drain  Duration           Urethral Catheter Three way 22 Fr. <1 day                Physical Exam  Constitutional: General: She is not in acute distress. Appearance: She is well-developed. She is not diaphoretic. HENT:      Head: Normocephalic and atraumatic. Right Ear: External ear normal.      Left Ear: External ear normal.      Mouth/Throat:      Pharynx: No oropharyngeal exudate. Eyes:      Conjunctiva/sclera: Conjunctivae normal.      Pupils: Pupils are equal, round, and reactive to light. Neck:      Thyroid: No thyromegaly. Trachea: No tracheal deviation. Cardiovascular:      Rate and Rhythm: Normal rate and regular rhythm. Heart sounds: Normal heart sounds. No murmur heard. No friction rub. No gallop. Pulmonary:      Effort: Pulmonary effort is normal. No respiratory distress. Breath sounds: Normal breath sounds. No wheezing or rales. Chest:      Chest wall: No tenderness. Abdominal:      General: Bowel sounds are normal. There is no distension. Palpations: Abdomen is soft. Tenderness: There is no abdominal tenderness. There is no guarding or rebound. Comments: Abdomen soft, mildly distended, normoactive bowel sounds, non-tender to palpation, no guarding or rebound, no peritoneal signs    Musculoskeletal:         General: No tenderness or deformity. Normal range of motion. Cervical back: Normal range of motion and neck supple. Skin:     General: Skin is warm and dry. Coloration: Skin is not pale. Findings: No erythema or rash. Neurological:      Mental Status: She is oriented to person, place, and time. Cranial Nerves: No cranial nerve deficit. Coordination: Coordination normal.      Deep Tendon Reflexes: Reflexes are normal and symmetric. Lab Results: I have personally reviewed pertinent lab results.     Recent Results (from the past 36 hour(s))   CBC and differential    Collection Time: 09/11/23  6:45 PM   Result Value Ref Range    WBC 10.38 (H) 4.31 - 10.16 Thousand/uL    RBC 4.23 3.81 - 5.12 Million/uL    Hemoglobin 12.8 11.5 - 15.4 g/dL    Hematocrit 38.2 34.8 - 46.1 %    MCV 90 82 - 98 fL    MCH 30.3 26.8 - 34.3 pg    MCHC 33.5 31.4 - 37.4 g/dL    RDW 12.6 11.6 - 15.1 %    MPV 10.1 8.9 - 12.7 fL    Platelets 510 308 - 732 Thousands/uL    nRBC 0 /100 WBCs    Neutrophils Relative 80 (H) 43 - 75 %    Immat GRANS % 0 0 - 2 %    Lymphocytes Relative 16 14 - 44 %    Monocytes Relative 4 4 - 12 %    Eosinophils Relative 0 0 - 6 %    Basophils Relative 0 0 - 1 %    Neutrophils Absolute 8.27 (H) 1.85 - 7.62 Thousands/µL    Immature Grans Absolute 0.03 0.00 - 0.20 Thousand/uL    Lymphocytes Absolute 1.63 0.60 - 4.47 Thousands/µL    Monocytes Absolute 0.41 0.17 - 1.22 Thousand/µL    Eosinophils Absolute 0.02 0.00 - 0.61 Thousand/µL    Basophils Absolute 0.02 0.00 - 0.10 Thousands/µL   Comprehensive metabolic panel    Collection Time: 09/11/23  6:45 PM   Result Value Ref Range    Sodium 138 135 - 147 mmol/L    Potassium 3.9 3.5 - 5.3 mmol/L    Chloride 106 96 - 108 mmol/L    CO2 24 21 - 32 mmol/L    ANION GAP 8 mmol/L    BUN 11 5 - 25 mg/dL    Creatinine 0.52 (L) 0.60 - 1.30 mg/dL    Glucose 90 65 - 140 mg/dL    Calcium 9.7 8.4 - 10.2 mg/dL    AST 12 (L) 13 - 39 U/L    ALT 11 7 - 52 U/L    Alkaline Phosphatase 57 34 - 104 U/L    Total Protein 7.0 6.4 - 8.4 g/dL    Albumin 4.4 3.5 - 5.0 g/dL    Total Bilirubin 0.48 0.20 - 1.00 mg/dL    eGFR 131 ml/min/1.73sq m   UA w Reflex to Microscopic w Reflex to Culture    Collection Time: 09/11/23  7:18 PM    Specimen: Urine, Clean Catch   Result Value Ref Range    Color, UA Light Orange     Clarity, UA Turbid     Specific Gravity, UA 1.007 1.003 - 1.030    pH, UA 6.0 4.5, 5.0, 5.5, 6.0, 6.5, 7.0, 7.5, 8.0    Leukocytes, UA Large (A) Negative    Nitrite, UA Negative Negative    Protein, UA 30 (1+) (A) Negative mg/dl    Glucose, UA Negative Negative mg/dl    Ketones, UA 10 (1+) (A) Negative mg/dl    Urobilinogen, UA <2.0 <2.0 mg/dl mg/dl    Bilirubin, UA Negative Negative    Occult Blood, UA Large (A) Negative Urine Microscopic    Collection Time: 09/11/23  7:18 PM   Result Value Ref Range    RBC, UA Innumerable (A) None Seen, 1-2 /hpf    WBC, UA 10-20 (A) None Seen, 1-2 /hpf    Epithelial Cells Innumerable (A) None Seen, Occasional /hpf    Bacteria, UA Innumerable (A) None Seen, Occasional /hpf   POCT pregnancy, urine    Collection Time: 09/11/23  7:25 PM   Result Value Ref Range    EXT Preg Test, Ur Negative     Control Valid    Lactic acid, plasma (w/reflex if result > 2.0)    Collection Time: 09/11/23 11:26 PM   Result Value Ref Range    LACTIC ACID 0.4 (L) 0.5 - 2.0 mmol/L   Blood culture    Collection Time: 09/12/23 12:12 AM    Specimen: Arm, Right; Blood   Result Value Ref Range    Blood Culture Received in Microbiology Lab. Culture in Progress. Blood culture    Collection Time: 09/12/23 12:12 AM    Specimen: Arm, Left; Blood   Result Value Ref Range    Blood Culture Received in Microbiology Lab. Culture in Progress.     UA w Reflex to Microscopic w Reflex to Culture    Collection Time: 09/12/23  2:30 AM    Specimen: Urine, Indwelling Lee Catheter   Result Value Ref Range    Color, UA Colorless     Clarity, UA Clear     Specific Gravity, UA >=1.050 (H) 1.003 - 1.030    pH, UA 6.5 4.5, 5.0, 5.5, 6.0, 6.5, 7.0, 7.5, 8.0    Leukocytes, UA Negative Negative    Nitrite, UA Positive (A) Negative    Protein, UA Negative Negative mg/dl    Glucose, UA Negative Negative mg/dl    Ketones, UA 60 (2+) (A) Negative mg/dl    Urobilinogen, UA <2.0 <2.0 mg/dl mg/dl    Bilirubin, UA Negative Negative    Occult Blood, UA Small (A) Negative   Urine Microscopic    Collection Time: 09/12/23  2:30 AM   Result Value Ref Range    RBC, UA 2-4 (A) None Seen, 1-2 /hpf    WBC, UA 1-2 None Seen, 1-2 /hpf    Epithelial Cells Occasional None Seen, Occasional /hpf    Bacteria, UA None Seen None Seen, Occasional /hpf   Basic metabolic panel    Collection Time: 09/12/23  5:07 AM   Result Value Ref Range    Sodium 138 135 - 147 mmol/L Potassium 3.9 3.5 - 5.3 mmol/L    Chloride 107 96 - 108 mmol/L    CO2 26 21 - 32 mmol/L    ANION GAP 5 mmol/L    BUN 10 5 - 25 mg/dL    Creatinine 0.48 (L) 0.60 - 1.30 mg/dL    Glucose 132 65 - 140 mg/dL    Calcium 9.2 8.4 - 10.2 mg/dL    eGFR 134 ml/min/1.73sq m   CBC and differential    Collection Time: 09/12/23  5:07 AM   Result Value Ref Range    WBC 13.05 (H) 4.31 - 10.16 Thousand/uL    RBC 4.41 3.81 - 5.12 Million/uL    Hemoglobin 13.2 11.5 - 15.4 g/dL    Hematocrit 40.3 34.8 - 46.1 %    MCV 91 82 - 98 fL    MCH 29.9 26.8 - 34.3 pg    MCHC 32.8 31.4 - 37.4 g/dL    RDW 12.5 11.6 - 15.1 %    MPV 9.8 8.9 - 12.7 fL    Platelets 045 529 - 043 Thousands/uL    nRBC 0 /100 WBCs    Neutrophils Relative 86 (H) 43 - 75 %    Immat GRANS % 1 0 - 2 %    Lymphocytes Relative 10 (L) 14 - 44 %    Monocytes Relative 3 (L) 4 - 12 %    Eosinophils Relative 0 0 - 6 %    Basophils Relative 0 0 - 1 %    Neutrophils Absolute 11.30 (H) 1.85 - 7.62 Thousands/µL    Immature Grans Absolute 0.07 0.00 - 0.20 Thousand/uL    Lymphocytes Absolute 1.27 0.60 - 4.47 Thousands/µL    Monocytes Absolute 0.39 0.17 - 1.22 Thousand/µL    Eosinophils Absolute 0.00 0.00 - 0.61 Thousand/µL    Basophils Absolute 0.02 0.00 - 0.10 Thousands/µL   Fingerstick Glucose (POCT)    Collection Time: 09/12/23  1:30 PM   Result Value Ref Range    POC Glucose 142 (H) 65 - 140 mg/dl     Imaging: I have personally reviewed pertinent reports. CT abdomen pelvis wo contrast    Result Date: 9/12/2023  Impression: No significant change from yesterday's study. No hydronephrosis. Workstation performed: [id number]     XR abdomen 1 view kub    Result Date: 9/12/2023  Impression: No radiopaque urinary tract calculi. Workstation performed: [id number]     CT abdomen pelvis with contrast    Result Date: 9/12/2023  Impression: No evidence of acute intra-abdominal or pelvic process.  Workstation performed: [id number]     CT renal stone study abdomen pelvis wo contrast    Result Date: 9/11/2023  Impression: No nephroureterolithiasis. No hydronephrosis. If patient's gross hematuria persists, consider dedicated CT renal mass protocol to assess for underlying abnormal enhancement. No acute abnormality to account for patient's complaint of pain. Workstation performed: [id number]       EKG, Pathology, and Other Studies: I have personally reviewed pertinent reports.

## 2023-09-12 NOTE — ASSESSMENT & PLAN NOTE
· Prior pseudoseizure hx vs seizure in setting of marcie crisis noted in care everywhere  · Not currently on outpatient antiepileptic

## 2023-09-12 NOTE — H&P
[address]  H&P  Name: Taniya Link 33 y.o. female I MRN: [id number]  Unit/Bed#: [facility] I Date of Admission: 9/11/2023   Date of Service: 9/12/2023 I Hospital Day: 1      Assessment/Plan   * Hematuria  Assessment & Plan  Patient reports dieter blood in her urine for the past two weeks. Today she began to have difficulty urinating and developed severe left flank pain described as "I feel like my kidney exploded" earlier today. She receives most of her care at [facility] and reports prior hx of marcie crisis requiring intubation. Patient is tearful and reports continued ongoing left flank pain at this time. /60 (BP Location: Left arm)   Pulse 80   Temp (!) 97.3 °F (36.3 °C)   Resp 17   Ht 5' 2" (1.575 m)   Wt 72.6 kg (160 lb 0.9 oz)   SpO2 95%   BMI 29.27 kg/m²     · Reports dieter blood in urine x2weeks  · Today began having difficulty urinating and left flank pain  · Has been able to urinate in the ED without difficulty   · CT renal study (non-con) and CT abd/pelvis w/contrast showing significant bladder distention, otherwise negative for acute process (no stones, no hydro)  · UA suggestive of possible UTI; not meeting SEPSIS criteria   · Pregnancy negative   · No SHARMILA; not meeting SIRS  · Unsure of cause currently; UTI possibly explains hematuria? However unsure given degree of blood and associated left flank pain? (Possible radiolucent stone vs recently passed stone?  Although no hydro on scan)  · Lee catheter to be placed overnight   · IV rocephin ordered   · F/u w/ pending urine cultures  · Urology consulted      [place]'s disease [facility])  Assessment & Plan  · Given 100mg IV hydrocortisone in ED   · Will continue stress dose 50mg IV hydrocortixone q6H overnight while pending further review     A-fib [facility])  Assessment & Plan  · Family reports prior hx of afib  · Was previously on coumadin and later eliquis  · Is no longer on AC  · Afib thought to be 2/2 addisons crisis at this time     Asthma  Assessment & Plan  · Does not appear to be in acute exacerbation  · Continue prehospital inhalers   · Monitor respiratory status     Tonic clonic seizures ([facility])  Assessment & Plan  · Prior pseudoseizure hx vs seizure in setting of marcie crisis noted in care everywhere  · Not currently on outpatient antiepileptic     VTE Pharmacologic Prophylaxis: VTE Score: 2 Low Risk (Score 0-2) - Encourage Ambulation. Code Status: Level 1 - Full Code   Discussion with family: update in AM.     Anticipated Length of Stay: Patient will be admitted on an inpatient basis with an anticipated length of stay of greater than 2 midnights secondary to hematuria . Chief Complaint: hematuria and flank pain     History of Present Illness:  rAti Rodriguez is a 33 y.o. female with a PMH of Eddy's disease, asthma, and pseudoseizure who presents with hematuria and flank pain. Patient reports dieter blood in her urine for the past two weeks. Today she began to have difficulty urinating and developed severe left flank pain described as "I feel like my kidney exploded" earlier today. She receives most of her care at [facility] and reports prior hx of marcie crisis requiring intubation. Patient is tearful and reports continued ongoing left flank pain at this time. All questions answered at the bedside to the best of my ability. Review of Systems:  Review of Systems   Constitutional: Negative for activity change, appetite change, chills, diaphoresis, fatigue and fever. HENT: Negative for congestion, ear pain, nosebleeds and trouble swallowing. Eyes: Negative for pain and visual disturbance. Respiratory: Negative for apnea, cough, chest tightness, shortness of breath and wheezing. Cardiovascular: Negative for chest pain, palpitations and leg swelling. Gastrointestinal: Negative for abdominal distention, abdominal pain, blood in stool, constipation, diarrhea, nausea and vomiting. Endocrine: Negative for cold intolerance, heat intolerance and polyuria. Genitourinary: Positive for difficulty urinating, flank pain and hematuria. Negative for dysuria. Musculoskeletal: Negative for arthralgias, neck pain and neck stiffness. Skin: Negative for color change, rash and wound. Neurological: Negative for dizziness, tremors, syncope, weakness, light-headedness, numbness and headaches. Hematological: Negative for adenopathy. All other systems reviewed and are negative. Past Medical and Surgical History:   Past Medical History:   Diagnosis Date   • [place]'s disease ([address])     requires steroids and had been intubated   • Anemia    • Asthma     hx of multiple intubations   • Hypokalemia    • Tonic clonic seizures ([address])        Past Surgical History:   Procedure Laterality Date   • APPENDECTOMY     •  SECTION      x2   • REDUCTION MAMMAPLASTY         Meds/Allergies:  Prior to Admission medications    Not on File     I have reviewed home medications with patient personally. Allergies: Allergies   Allergen Reactions   • Erythromycin Rash       Social History:  Marital Status: Single   Occupation: N/A  Patient Pre-hospital Living Situation: Home  Patient Pre-hospital Level of Mobility: walks  Patient Pre-hospital Diet Restrictions: None  Substance Use History:   Social History     Substance and Sexual Activity   Alcohol Use Never     Social History     Tobacco Use   Smoking Status Never   Smokeless Tobacco Never     Social History     Substance and Sexual Activity   Drug Use Never       Family History:  History reviewed. No pertinent family history.     Physical Exam:     Vitals:   Blood Pressure: 123/79 (23)  Pulse: 89 (23)  Temperature: 97.7 °F (36.5 °C) (23)  Respirations: 20 (23)  Height: 5' 2" (157.5 cm) (23)  Weight - Scale: 72.6 kg (160 lb 0.9 oz) (23)  SpO2: 94 % (23)    Physical Exam  Vitals and nursing note reviewed. Constitutional:       General: She is in acute distress (Patient appears, anxious, tearfull, and in pain). Appearance: Normal appearance. HENT:      Head: Normocephalic and atraumatic. Right Ear: External ear normal.      Left Ear: External ear normal.      Nose: Nose normal.      Mouth/Throat:      Mouth: Mucous membranes are moist.   Eyes:      Pupils: Pupils are equal, round, and reactive to light. Cardiovascular:      Rate and Rhythm: Normal rate and regular rhythm. Pulses: Normal pulses. Heart sounds: Normal heart sounds. No murmur heard. Pulmonary:      Effort: Pulmonary effort is normal. No respiratory distress. Breath sounds: Normal breath sounds. No wheezing or rales. Chest:      Chest wall: No tenderness. Abdominal:      General: Bowel sounds are normal. There is no distension. Palpations: Abdomen is soft. There is no mass. Tenderness: There is no abdominal tenderness. There is left CVA tenderness. There is no guarding. Musculoskeletal:         General: No swelling or tenderness. Cervical back: Normal range of motion and neck supple. No rigidity or tenderness. Right lower leg: No edema. Left lower leg: No edema. Skin:     General: Skin is warm and dry. Capillary Refill: Capillary refill takes less than 2 seconds. Findings: No lesion or rash. Neurological:      General: No focal deficit present. Mental Status: She is alert.    Psychiatric:         Mood and Affect: Mood normal.          Additional Data:     Lab Results:  Results from last 7 days   Lab Units 09/11/23  1845   WBC Thousand/uL 10.38*   HEMOGLOBIN g/dL 12.8   HEMATOCRIT % 38.2   PLATELETS Thousands/uL 256   NEUTROS PCT % 80*   LYMPHS PCT % 16   MONOS PCT % 4   EOS PCT % 0     Results from last 7 days   Lab Units 09/11/23  1845   SODIUM mmol/L 138   POTASSIUM mmol/L 3.9   CHLORIDE mmol/L 106   CO2 mmol/L 24   BUN mg/dL 11   CREATININE mg/dL 0.52*   ANION GAP mmol/L 8   CALCIUM mg/dL 9.7   ALBUMIN g/dL 4.4   TOTAL BILIRUBIN mg/dL 0.48   ALK PHOS U/L 57   ALT U/L 11   AST U/L 12*   GLUCOSE RANDOM mg/dL 90                 Results from last 7 days   Lab Units 09/11/23  2326   LACTIC ACID mmol/L 0.4*       Lines/Drains:  Invasive Devices     Peripheral Intravenous Line  Duration           Peripheral IV 09/11/23 Right Antecubital <1 day                    Imaging: Reviewed radiology reports from this admission including: CT renal stone study   CT abdomen pelvis with contrast   Final Result by Yasir Flores MD (09/12 0014)      No evidence of acute intra-abdominal or pelvic process. Workstation performed: [id number]         CT renal stone study abdomen pelvis wo contrast   Final Result by Sergo Ramirez MD (09/11 2049)      No nephroureterolithiasis. No hydronephrosis. If patient's gross hematuria persists, consider dedicated CT renal mass protocol to assess for underlying abnormal enhancement. No acute abnormality to account for patient's complaint of pain. Workstation performed: [id number]         XR abdomen 1 view kub    (Results Pending)       EKG and Other Studies Reviewed on Admission:   · EKG: No EKG obtained. ** Please Note: This note has been constructed using a voice recognition system.  **

## 2023-09-12 NOTE — ED PROVIDER NOTES
History  Chief Complaint   Patient presents with   • Flank Pain     L sided flank pain starting today, "Feels like my kidney exploded." Unable to void. Pt reports dieter blood in her urine x2 weeks, had UA last week that did not show infection. 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 Patient has been intubated and been in crisis on multiple occasions. She has had multiple ICU stays for this as well. Currently the patient is sitting in bed, in some distress from discomfort, describes feeling as if her kidney exploded. 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 No pertinent family history. I have reviewed and agree with the history as documented. E-Cigarette/Vaping   • E-Cigarette Use Never User      E-Cigarette/Vaping Substances     Social History     Tobacco Use   • Smoking status: Never   • Smokeless tobacco: Never   Vaping Use   • Vaping Use: Never used   Substance Use Topics   • Alcohol use: Never   • Drug use: Never       Review of Systems   Genitourinary: Positive for flank pain.    All other systems reviewed and are negative. Physical Exam  Physical Exam  Vitals and nursing note reviewed. Constitutional:       Appearance: She is well-developed. HENT:      Head: Normocephalic and atraumatic. Right Ear: External ear normal.      Left Ear: External ear normal.   Eyes:      Conjunctiva/sclera: Conjunctivae normal.   Neck:      Thyroid: No thyromegaly. Vascular: No JVD. Trachea: No tracheal deviation. Cardiovascular:      Rate and Rhythm: Normal rate. Pulmonary:      Effort: Pulmonary effort is normal.      Breath sounds: Normal breath sounds. No stridor. Abdominal:      General: There is no distension. Palpations: Abdomen is soft. There is no mass. Tenderness: There is no abdominal tenderness. There is no guarding. Hernia: No hernia is present. Musculoskeletal:         General: No tenderness or deformity. Normal range of motion. Lymphadenopathy:      Cervical: No cervical adenopathy. Skin:     General: Skin is warm. Coloration: Skin is not pale. Findings: No erythema or rash. Neurological:      Mental Status: She is alert and oriented to person, place, and time.    Psychiatric:         Behavior: Behavior normal.         Vital Signs  ED Triage Vitals [09/11/23 1735]   Temperature Pulse Respirations Blood Pressure SpO2   (!) 97.3 °F (36.3 °C) 83 20 134/90 99 %      Temp src Heart Rate Source Patient Position - Orthostatic VS BP Location FiO2 (%)   -- Monitor Sitting Left arm --      Pain Score       9           Vitals:    09/11/23 1735 09/11/23 2048   BP: 134/90 127/60   Pulse: 83 80   Patient Position - Orthostatic VS: Sitting Lying         Visual Acuity      ED Medications  Medications   cefTRIAXone (ROCEPHIN) IVPB (premix in dextrose) 1,000 mg 50 mL (has no administration in time range)   ketorolac (TORADOL) injection 30 mg (30 mg Intravenous Given 9/11/23 1846)   hydrocortisone (Solu-CORTEF) injection 100 mg (100 mg Intravenous Given 9/11/23 1846)   sodium chloride 0.9 % bolus 1,000 mL (0 mL Intravenous Stopped 9/11/23 2217)   morphine injection 6 mg (6 mg Intravenous Given 9/11/23 2016)   ondansetron (ZOFRAN) injection 4 mg (4 mg Intravenous Given 9/11/23 2016)   ketorolac (TORADOL) injection 30 mg (30 mg Intravenous Given 9/11/23 2212)   sodium chloride 0.9 % bolus 1,000 mL (1,000 mL Intravenous New Bag 9/11/23 2223)   HYDROmorphone (DILAUDID) injection 0.5 mg (0.5 mg Intravenous Given 9/11/23 2212)   iohexol (OMNIPAQUE) 350 MG/ML injection (MULTI-DOSE) 100 mL (100 mL Intravenous Given 9/11/23 2337)       Diagnostic Studies  Results Reviewed     Procedure Component Value Units Date/Time    Blood culture [[id number]]     Lab Status: No result Specimen: Blood     Blood culture [[id number]]     Lab Status: No result Specimen: Blood     Lactic acid, plasma (w/reflex if result > 2.0) [[id number]] Collected: 09/11/23 2326    Lab Status: In process Specimen: Blood from Arm, Right Updated: 09/11/23 2331    Urine Microscopic [[id number]]  (Abnormal) Collected: 09/11/23 1918    Lab Status: Final result Specimen: Urine, Clean Catch Updated: 09/11/23 1936     RBC, UA Innumerable /hpf      WBC, UA 10-20 /hpf      Epithelial Cells Innumerable /hpf      Bacteria, UA Innumerable /hpf     Urine culture [[id number]] Collected: 09/11/23 1918    Lab Status:  In process Specimen: Urine, Clean Catch Updated: 09/11/23 1936    UA w Reflex to Microscopic w Reflex to Culture [[id number]]  (Abnormal) Collected: 09/11/23 1918    Lab Status: Final result Specimen: Urine, Clean Catch Updated: 09/11/23 1932     Color, UA Light Orange     Clarity, UA Turbid     Specific Gravity, UA 1.007     pH, UA 6.0     Leukocytes, UA Large     Nitrite, UA Negative     Protein, UA 30 (1+) mg/dl      Glucose, UA Negative mg/dl      Ketones, UA 10 (1+) mg/dl      Urobilinogen, UA <2.0 mg/dl      Bilirubin, UA Negative     Occult Blood, UA Large    Comprehensive metabolic panel [[id number]]  (Abnormal) Collected: 09/11/23 3999    Lab Status: Final result Specimen: Blood from Arm, Right Updated: 09/11/23 1926     Sodium 138 mmol/L      Potassium 3.9 mmol/L      Chloride 106 mmol/L      CO2 24 mmol/L      ANION GAP 8 mmol/L      BUN 11 mg/dL      Creatinine 0.52 mg/dL      Glucose 90 mg/dL      Calcium 9.7 mg/dL      AST 12 U/L      ALT 11 U/L      Alkaline Phosphatase 57 U/L      Total Protein 7.0 g/dL      Albumin 4.4 g/dL      Total Bilirubin 0.48 mg/dL      eGFR 131 ml/min/1.73sq m     Narrative:      National Kidney Disease Foundation guidelines for Chronic Kidney Disease (CKD):   •  Stage 1 with normal or high GFR (GFR > 90 mL/min/1.73 square meters)  •  Stage 2 Mild CKD (GFR = 60-89 mL/min/1.73 square meters)  •  Stage 3A Moderate CKD (GFR = 45-59 mL/min/1.73 square meters)  •  Stage 3B Moderate CKD (GFR = 30-44 mL/min/1.73 square meters)  •  Stage 4 Severe CKD (GFR = 15-29 mL/min/1.73 square meters)  •  Stage 5 End Stage CKD (GFR <15 mL/min/1.73 square meters)  Note: GFR calculation is accurate only with a steady state creatinine    POCT pregnancy, urine [[id number]]  (Normal) Resulted: 09/11/23 1925    Lab Status: Final result Updated: 09/11/23 1925     EXT Preg Test, Ur Negative     Control Valid    CBC and differential [[id number]]  (Abnormal) Collected: 09/11/23 1845    Lab Status: Final result Specimen: Blood from Arm, Right Updated: 09/11/23 1901     WBC 10.38 Thousand/uL      RBC 4.23 Million/uL      Hemoglobin 12.8 g/dL      Hematocrit 38.2 %      MCV 90 fL      MCH 30.3 pg      MCHC 33.5 g/dL      RDW 12.6 %      MPV 10.1 fL      Platelets 647 Thousands/uL      nRBC 0 /100 WBCs      Neutrophils Relative 80 %      Immat GRANS % 0 %      Lymphocytes Relative 16 %      Monocytes Relative 4 %      Eosinophils Relative 0 %      Basophils Relative 0 %      Neutrophils Absolute 8.27 Thousands/µL      Immature Grans Absolute 0.03 Thousand/uL      Lymphocytes Absolute 1.63 Thousands/µL      Monocytes Absolute 0.41 Thousand/µL Eosinophils Absolute 0.02 Thousand/µL      Basophils Absolute 0.02 Thousands/µL                  CT renal stone study abdomen pelvis wo contrast   Final Result by Greg Hernandez MD (09/11 2049)      No nephroureterolithiasis. No hydronephrosis. If patient's gross hematuria persists, consider dedicated CT renal mass protocol to assess for underlying abnormal enhancement. No acute abnormality to account for patient's complaint of pain. Workstation performed: [id number]         XR abdomen 1 view kub    (Results Pending)   CT abdomen pelvis with contrast    (Results Pending)              Procedures  Procedures         ED Course  ED Course as of 09/11/23 2356   Mon Sep 11, 2023   2317 Because of the patient's history of intermittent atrial fibrillation which was not known to me until now the patient will be sent back to CT scan to assess for possible occlusion of the intestinal circulation. SBIRT 20yo+    Flowsheet Row Most Recent Value   Initial Alcohol Screen: US AUDIT-C     1. How often do you have a drink containing alcohol? 0 Filed at: 09/11/2023 1740   2. How many drinks containing alcohol do you have on a typical day you are drinking? 0 Filed at: 09/11/2023 1740   3a. Male UNDER 65: How often do you have five or more drinks on one occasion? 0 Filed at: 09/11/2023 1740   3b. FEMALE Any Age, or MALE 65+: How often do you have 4 or more drinks on one occassion? 0 Filed at: 09/11/2023 1740   Audit-C Score 0 Filed at: 09/11/2023 1740   GALLO: How many times in the past year have you. .. Used an illegal drug or used a prescription medication for non-medical reasons? Never Filed at: 09/11/2023 1740                    Medical Decision Making  Abdominal pain: acute illness or injury  Hematuria, gross: acute illness or injury  Amount and/or Complexity of Data Reviewed  Labs: ordered. Radiology: ordered. Risk  Prescription drug management.   Decision regarding hospitalization. Disposition  Final diagnoses:   Hematuria, gross   Abdominal pain     Time reflects when diagnosis was documented in both MDM as applicable and the Disposition within this note     Time User Action Codes Description Comment    9/11/2023 10:04 PM Alvena Maxin Add [R31.0] Hematuria, gross     9/11/2023 11:18 PM Alvena Maxin Add [R10.9] Abdominal pain       ED Disposition     ED Disposition   Admit    Condition   Stable    Date/Time   Mon Sep 11, 2023 11:51 PM    Comment   Case was discussed with Sarah Alvarez and the patient's admission status was agreed to be Admission Status: observation status to the service of Dr. Gregorio José . Follow-up Information     Follow up With Specialties Details Why Contact Info Additional Information    [address] [place] [address] Emergency Department Emergency Medicine  As needed [address] [place] [address] Emergency Department, [place], [place], [address]          Patient's Medications    No medications on file       No discharge procedures on file.     PDMP Review     None          ED Provider  Electronically Signed by           Maura Alexis DO  09/11/23 0340

## 2023-09-12 NOTE — PROGRESS NOTES
[address]  Progress Note  Name: Beto Thao  MRN: [id number]  Unit/Bed#: -01 I Date of Admission: 9/11/2023   Date of Service: 9/12/2023 I Hospital Day: 1    Assessment/Plan   * Hematuria  Assessment & Plan  · Today began having difficulty urinating and left flank pain  · CT renal study (non-con) and CT abd/pelvis w/contrast showing significant bladder distention, otherwise negative for acute process (no stones, no hydro)  · UA suggestive of possible UTI; not meeting SEPSIS criteria   · Pregnancy negative  · Continue Lee and will be discharged with Lee to follow-up with urology as outpatient  · Likely related to cystitis  · Follow-up urine culture and blood culture  · Continue Rocephin  · Urology following      Abdominal distention  Assessment & Plan  · Patient noted to have acute abdominal distention  · CT abdomen pelvis-no significant change from prior study. Moderate proximal colonic stool burden without bowel obstruction  · General surgery consulted and appreciate recommendations    A-fib [facility])  Assessment & Plan  · Family reports prior hx of afib  · Was previously on coumadin and later eliquis  · Is no longer on AC  · Afib thought to be 2/2 addisons crisis at this time     Asthma  Assessment & Plan  · Does not appear to be in acute exacerbation  · Continue prehospital inhalers   · Monitor respiratory status     Tonic clonic seizures ([address])  Assessment & Plan  · Prior pseudoseizure hx vs seizure in setting of marcie crisis noted in care everywhere  · Not currently on outpatient antiepileptic     Talbot's disease ([address])  Assessment & Plan  · Given 100mg IV hydrocortisone in ED   · Will continue stress dose 50mg IV hydrocortixone q6H overnight while pending further review            VTE Pharmacologic Prophylaxis: VTE Score: 2 Low Risk (Score 0-2) - Encourage Ambulation. Patient Centered Rounds: I performed bedside rounds with nursing staff today.   Discussions with Specialists or Other Care Team Provider: Urology, case management    Education and Discussions with Family / Patient: Updated  (significant other) at bedside. Total Time Spent on Date of Encounter in care of patient: 45 minutes This time was spent on one or more of the following: performing physical exam; counseling and coordination of care; obtaining or reviewing history; documenting in the medical record; reviewing/ordering tests, medications or procedures; communicating with other healthcare professionals and discussing with patient's family/caregivers. Current Length of Stay: 1 day(s)  Current Patient Status: Inpatient   Certification Statement: The patient will continue to require additional inpatient hospital stay due to Hematuria  Discharge Plan: Anticipate discharge in 24-48 hrs to home. Code Status: Level 1 - Full Code    Subjective:   Patient resting comfortably on examination. Patient still complaining of flank pain. Patient without any other complaints. Objective:     Vitals:   Temp (24hrs), Av.8 °F (36.6 °C), Min:97.3 °F (36.3 °C), Max:98.2 °F (36.8 °C)    Temp:  [97.3 °F (36.3 °C)-98.2 °F (36.8 °C)] 98.2 °F (36.8 °C)  HR:  [] 109  Resp:  [17-20] 20  BP: (121-151)/() 122/72  SpO2:  [94 %-99 %] 98 %  Body mass index is 29.27 kg/m². Input and Output Summary (last 24 hours): Intake/Output Summary (Last 24 hours) at 2023 1538  Last data filed at 2023 0401  Gross per 24 hour   Intake 3360 ml   Output 700 ml   Net 2660 ml       Physical Exam:   Physical Exam  Vitals and nursing note reviewed. Constitutional:       General: She is not in acute distress. Appearance: She is well-developed. HENT:      Head: Normocephalic and atraumatic. Eyes:      General: No scleral icterus. Conjunctiva/sclera: Conjunctivae normal.   Cardiovascular:      Rate and Rhythm: Normal rate and regular rhythm. Heart sounds: Normal heart sounds.  No murmur heard. No friction rub. No gallop. Pulmonary:      Effort: Pulmonary effort is normal. No respiratory distress. Breath sounds: Normal breath sounds. No wheezing or rales. Abdominal:      General: Bowel sounds are normal. There is no distension. Palpations: Abdomen is soft. Tenderness: There is no abdominal tenderness. Musculoskeletal:         General: Normal range of motion. Skin:     General: Skin is warm. Findings: No rash. Neurological:      Mental Status: She is alert and oriented to person, place, and time. Additional Data:     Labs:  Results from last 7 days   Lab Units 09/12/23  0507   WBC Thousand/uL 13.05*   HEMOGLOBIN g/dL 13.2   HEMATOCRIT % 40.3   PLATELETS Thousands/uL 262   NEUTROS PCT % 86*   LYMPHS PCT % 10*   MONOS PCT % 3*   EOS PCT % 0     Results from last 7 days   Lab Units 09/12/23  0507 09/11/23  1845   SODIUM mmol/L 138 138   POTASSIUM mmol/L 3.9 3.9   CHLORIDE mmol/L 107 106   CO2 mmol/L 26 24   BUN mg/dL 10 11   CREATININE mg/dL 0.48* 0.52*   ANION GAP mmol/L 5 8   CALCIUM mg/dL 9.2 9.7   ALBUMIN g/dL  --  4.4   TOTAL BILIRUBIN mg/dL  --  0.48   ALK PHOS U/L  --  57   ALT U/L  --  11   AST U/L  --  12*   GLUCOSE RANDOM mg/dL 132 90         Results from last 7 days   Lab Units 09/12/23  1330   POC GLUCOSE mg/dl 142*         Results from last 7 days   Lab Units 09/11/23  2326   LACTIC ACID mmol/L 0.4*       Lines/Drains:  Invasive Devices     Peripheral Intravenous Line  Duration           Peripheral IV 09/11/23 Right Antecubital <1 day          Drain  Duration           Urethral Catheter Three way 22 Fr. <1 day                            Imaging: No pertinent imaging reviewed. Recent Cultures (last 7 days):   Results from last 7 days   Lab Units 09/12/23  0012   BLOOD CULTURE  Received in Microbiology Lab. Culture in Progress. Received in Microbiology Lab. Culture in Progress.        Last 24 Hours Medication List:   Current Facility-Administered Medications   Medication Dose Route Frequency Provider Last Rate   • acetaminophen  650 mg Oral Q6H PRN Dionna Maldonado PA-C     • budesonide-formoterol  2 puff Inhalation BID Elizabeth Boyd PA-C     • cefTRIAXone  1,000 mg Intravenous Q24H Dionna Maldonado PA-C 1,000 mg (09/12/23 0028)   • hydrocortisone sodium succinate  50 mg [facility]venous Q6H [facility]     • HYDROmorphone  0.5 mg Intravenous Q4H PRN Dionna Maldonado PA-C     • HYDROmorphone  1 mg Intravenous Q4H PRN Dionna Maldonado PA-C     • lidocaine  1 patch Topical Daily Susan Colunga     • montelukast  10 mg Oral HS Susan Sherman     • promethazine  25 mg Intramuscular Q6H PRN Fabiano Rios DO     • senna-docusate sodium  1 tablet Oral HS Fabiano Rios DO     • sodium chloride  100 mL/hr Intravenous Continuous Dionna Maldonado PA-C 100 mL/hr (09/12/23 1214)        Today, Patient Was Seen By: Fabiano Rios DO    **Please Note: This note may have been constructed using a voice recognition system. **

## 2023-09-12 NOTE — ASSESSMENT & PLAN NOTE
· Patient noted to have acute abdominal distention  · CT abdomen pelvis-no significant change from prior study.   Moderate proximal colonic stool burden without bowel obstruction  · General surgery consulted and appreciate recommendations

## 2023-09-13 ENCOUNTER — TELEPHONE (OUTPATIENT)
Dept: OTHER | Facility: HOSPITAL | Age: 28
End: 2023-09-13

## 2023-09-13 VITALS
WEIGHT: 160.05 LBS | HEART RATE: 66 BPM | HEIGHT: 62 IN | TEMPERATURE: 97.9 F | OXYGEN SATURATION: 98 % | DIASTOLIC BLOOD PRESSURE: 66 MMHG | SYSTOLIC BLOOD PRESSURE: 109 MMHG | BODY MASS INDEX: 29.45 KG/M2 | RESPIRATION RATE: 18 BRPM

## 2023-09-13 LAB
BACTERIA UR CULT: ABNORMAL
BASOPHILS # BLD AUTO: 0.01 THOUSANDS/ÂΜL (ref 0–0.1)
BASOPHILS NFR BLD AUTO: 0 % (ref 0–1)
EOSINOPHIL # BLD AUTO: 0 THOUSAND/ÂΜL (ref 0–0.61)
EOSINOPHIL NFR BLD AUTO: 0 % (ref 0–6)
ERYTHROCYTE [DISTWIDTH] IN BLOOD BY AUTOMATED COUNT: 12.6 % (ref 11.6–15.1)
HCT VFR BLD AUTO: 33.3 % (ref 34.8–46.1)
HGB BLD-MCNC: 10.7 G/DL (ref 11.5–15.4)
IMM GRANULOCYTES # BLD AUTO: 0.04 THOUSAND/UL (ref 0–0.2)
IMM GRANULOCYTES NFR BLD AUTO: 0 % (ref 0–2)
LYMPHOCYTES # BLD AUTO: 1.83 THOUSANDS/ÂΜL (ref 0.6–4.47)
LYMPHOCYTES NFR BLD AUTO: 17 % (ref 14–44)
MCH RBC QN AUTO: 30.1 PG (ref 26.8–34.3)
MCHC RBC AUTO-ENTMCNC: 32.1 G/DL (ref 31.4–37.4)
MCV RBC AUTO: 94 FL (ref 82–98)
MONOCYTES # BLD AUTO: 0.67 THOUSAND/ÂΜL (ref 0.17–1.22)
MONOCYTES NFR BLD AUTO: 6 % (ref 4–12)
NEUTROPHILS # BLD AUTO: 8.35 THOUSANDS/ÂΜL (ref 1.85–7.62)
NEUTS SEG NFR BLD AUTO: 77 % (ref 43–75)
NRBC BLD AUTO-RTO: 0 /100 WBCS
PLATELET # BLD AUTO: 207 THOUSANDS/UL (ref 149–390)
PMV BLD AUTO: 9.9 FL (ref 8.9–12.7)
RBC # BLD AUTO: 3.56 MILLION/UL (ref 3.81–5.12)
WBC # BLD AUTO: 10.9 THOUSAND/UL (ref 4.31–10.16)

## 2023-09-13 PROCEDURE — 99239 HOSP IP/OBS DSCHRG MGMT >30: CPT | Performed by: INTERNAL MEDICINE

## 2023-09-13 PROCEDURE — 99232 SBSQ HOSP IP/OBS MODERATE 35: CPT | Performed by: NURSE PRACTITIONER

## 2023-09-13 PROCEDURE — 85025 COMPLETE CBC W/AUTO DIFF WBC: CPT | Performed by: INTERNAL MEDICINE

## 2023-09-13 RX ORDER — CEPHALEXIN 500 MG/1
500 CAPSULE ORAL EVERY 6 HOURS SCHEDULED
Qty: 4 CAPSULE | Refills: 0 | Status: SHIPPED | OUTPATIENT
Start: 2023-09-13 | End: 2023-09-14

## 2023-09-13 RX ORDER — OXYCODONE HYDROCHLORIDE 10 MG/1
10 TABLET ORAL EVERY 4 HOURS PRN
Qty: 10 TABLET | Refills: 0 | Status: SHIPPED | OUTPATIENT
Start: 2023-09-13 | End: 2023-09-23

## 2023-09-13 RX ORDER — AMOXICILLIN 250 MG
1 CAPSULE ORAL
Qty: 30 TABLET | Refills: 0 | Status: SHIPPED | OUTPATIENT
Start: 2023-09-13 | End: 2023-10-13

## 2023-09-13 RX ADMIN — HYDROMORPHONE HYDROCHLORIDE 1 MG: 1 INJECTION, SOLUTION INTRAMUSCULAR; INTRAVENOUS; SUBCUTANEOUS at 00:50

## 2023-09-13 RX ADMIN — HYDROCORTISONE SODIUM SUCCINATE 50 MG: 100 INJECTION, POWDER, FOR SOLUTION INTRAMUSCULAR; INTRAVENOUS at 06:17

## 2023-09-13 RX ADMIN — SODIUM CHLORIDE 100 ML/HR: 0.9 INJECTION, SOLUTION INTRAVENOUS at 09:22

## 2023-09-13 RX ADMIN — ACETAMINOPHEN 650 MG: 325 TABLET, FILM COATED ORAL at 07:53

## 2023-09-13 RX ADMIN — LIDOCAINE 1 PATCH: 50 PATCH CUTANEOUS at 08:59

## 2023-09-13 RX ADMIN — BUDESONIDE AND FORMOTEROL FUMARATE DIHYDRATE 2 PUFF: 160; 4.5 AEROSOL RESPIRATORY (INHALATION) at 08:59

## 2023-09-13 RX ADMIN — CEFTRIAXONE 1000 MG: 1 INJECTION, SOLUTION INTRAVENOUS at 00:31

## 2023-09-13 RX ADMIN — HYDROMORPHONE HYDROCHLORIDE 0.5 MG: 1 INJECTION, SOLUTION INTRAMUSCULAR; INTRAVENOUS; SUBCUTANEOUS at 07:53

## 2023-09-13 RX ADMIN — HYDROCORTISONE SODIUM SUCCINATE 50 MG: 100 INJECTION, POWDER, FOR SOLUTION INTRAMUSCULAR; INTRAVENOUS at 00:31

## 2023-09-13 NOTE — PLAN OF CARE
Problem: PAIN - ADULT  Goal: Verbalizes/displays adequate comfort level or baseline comfort level  Description: Interventions:  - Encourage patient to monitor pain and request assistance  - Assess pain using appropriate pain scale  - Administer analgesics based on type and severity of pain and evaluate response  - Implement non-pharmacological measures as appropriate and evaluate response  - Consider cultural and social influences on pain and pain management  - Notify physician/advanced practitioner if interventions unsuccessful or patient reports new pain  9/13/2023 1045 by Veria Mess  Outcome: Progressing  9/13/2023 1045 by Veria Mess  Outcome: Progressing     Problem: INFECTION - ADULT  Goal: Absence or prevention of progression during hospitalization  Description: INTERVENTIONS:  - Assess and monitor for signs and symptoms of infection  - Monitor lab/diagnostic results  - Monitor all insertion sites, i.e. indwelling lines, tubes, and drains  - Monitor endotracheal if appropriate and nasal secretions for changes in amount and color  - [place] appropriate cooling/warming therapies per order  - Administer medications as ordered  - Instruct and encourage patient and family to use good hand hygiene technique  - Identify and instruct in appropriate isolation precautions for identified infection/condition  9/13/2023 1045 by Veria Mess  Outcome: Progressing  9/13/2023 1045 by Veria Mess  Outcome: Progressing  Goal: Absence of fever/infection during neutropenic period  Description: INTERVENTIONS:  - Monitor WBC    9/13/2023 1045 by Veria Mess  Outcome: Progressing  9/13/2023 1045 by Veria Mess  Outcome: Progressing     Problem: SAFETY ADULT  Goal: Patient will remain free of falls  Description: INTERVENTIONS:  - Educate patient/family on patient safety including physical limitations  - Instruct patient to call for assistance with activity   - Consult OT/PT to assist with strengthening/mobility   - Keep Call bell within reach  - Keep bed low and locked with side rails adjusted as appropriate  - Keep care items and personal belongings within reach  - Initiate and maintain comfort rounds  - Make Fall Risk Sign visible to staff  - Offer Toileting every    Hours, in advance of need  - Initiate/Maintain   alarm  - Obtain necessary fall risk management equipment:     - Apply yellow socks and bracelet for high fall risk patients  - Consider moving patient to room near nurses station  9/13/2023 1045 by Chase Alberts  Outcome: Progressing  9/13/2023 1045 by Chase Alberts  Outcome: Progressing  Goal: Maintain or return to baseline ADL function  Description: INTERVENTIONS:  -  Assess patient's ability to carry out ADLs; assess patient's baseline for ADL function and identify physical deficits which impact ability to perform ADLs (bathing, care of mouth/teeth, toileting, grooming, dressing, etc.)  - Assess/evaluate cause of self-care deficits   - Assess range of motion  - Assess patient's mobility; develop plan if impaired  - Assess patient's need for assistive devices and provide as appropriate  - Encourage maximum independence but intervene and supervise when necessary  - Involve family in performance of ADLs  - Assess for home care needs following discharge   - Consider OT consult to assist with ADL evaluation and planning for discharge  - Provide patient education as appropriate  9/13/2023 1045 by Chase Alberts  Outcome: Progressing  9/13/2023 1045 by Chase Alberts  Outcome: Progressing  Goal: Maintains/Returns to pre admission functional level  Description: INTERVENTIONS:  - Perform BMAT or MOVE assessment daily.   - Set and communicate daily mobility goal to care team and patient/family/caregiver. - Collaborate with rehabilitation services on mobility goals if consulted  - Perform Range of Motion    times a day. - Reposition patient every    hours.   - Dangle patient      times a day  - Stand patient    times a day  - Ambulate patient    times a day  - Out of bed to chair    times a day   - Out of bed for meals  times a day  - Out of bed for toileting  - Record patient progress and toleration of activity level   9/13/2023 1045 by Chase Alberts  Outcome: Progressing  9/13/2023 1045 by Chase Alberts  Outcome: Progressing     Problem: DISCHARGE PLANNING  Goal: Discharge to home or other facility with appropriate resources  Description: INTERVENTIONS:  - Identify barriers to discharge w/patient and caregiver  - Arrange for needed discharge resources and transportation as appropriate  - Identify discharge learning needs (meds, wound care, etc.)  - Arrange for interpretive services to assist at discharge as needed  - Refer to Case Management Department for coordinating discharge planning if the patient needs post-hospital services based on physician/advanced practitioner order or complex needs related to functional status, cognitive ability, or social support system  9/13/2023 1045 by Chase Alberts  Outcome: Progressing  9/13/2023 1045 by Chase Alberts  Outcome: Progressing     Problem: Knowledge Deficit  Goal: Patient/family/caregiver demonstrates understanding of disease process, treatment plan, medications, and discharge instructions  Description: Complete learning assessment and assess knowledge base.   Interventions:  - Provide teaching at level of understanding  - Provide teaching via preferred learning methods  9/13/2023 1045 by Chase Alberts  Outcome: Progressing  9/13/2023 1045 by Chase Alberts  Outcome: Progressing     Problem: GASTROINTESTINAL - ADULT  Goal: Minimal or absence of nausea and/or vomiting  Description: INTERVENTIONS:  - Administer IV fluids if ordered to ensure adequate hydration  - Maintain NPO status until nausea and vomiting are resolved  - Nasogastric tube if ordered  - Administer ordered antiemetic medications as needed  - Provide nonpharmacologic comfort measures as appropriate  - Advance diet as tolerated, if ordered  - Consider nutrition services referral to assist patient with adequate nutrition and appropriate food choices  9/13/2023 1045 by Yan Christensen  Outcome: Progressing  9/13/2023 1045 by Yan Christensen  Outcome: Progressing  Goal: Maintains or returns to baseline bowel function  Description: INTERVENTIONS:  - Assess bowel function  - Encourage oral fluids to ensure adequate hydration  - Administer IV fluids if ordered to ensure adequate hydration  - Administer ordered medications as needed  - Encourage mobilization and activity  - Consider nutritional services referral to assist patient with adequate nutrition and appropriate food choices  9/13/2023 1045 by Yan Christensen  Outcome: Progressing  9/13/2023 1045 by Yan Christensen  Outcome: Progressing  Goal: Maintains adequate nutritional intake  Description: INTERVENTIONS:  - Monitor percentage of each meal consumed  - Identify factors contributing to decreased intake, treat as appropriate  - Assist with meals as needed  - Monitor I&O, weight, and lab values if indicated  - Obtain nutrition services referral as needed  Outcome: Progressing  Goal: Establish and maintain optimal ostomy function  Description: INTERVENTIONS:  - Assess bowel function  - Encourage oral fluids to ensure adequate hydration  - Administer IV fluids if ordered to ensure adequate hydration   - Administer ordered medications as needed  - Encourage mobilization and activity  - Nutrition services referral to assist patient with appropriate food choices  - Assess stoma site  - Consider wound care consult   Outcome: Progressing  Goal: Oral mucous membranes remain intact  Description: INTERVENTIONS  - Assess oral mucosa and hygiene practices  - Implement preventative oral hygiene regimen  - Implement oral medicated treatments as ordered  - Initiate Nutrition services referral as needed  Outcome: Progressing     Problem: GENITOURINARY - ADULT  Goal: Maintains or returns to baseline urinary function  Description: INTERVENTIONS:  - Assess urinary function  - Encourage oral fluids to ensure adequate hydration if ordered  - Administer IV fluids as ordered to ensure adequate hydration  - Administer ordered medications as needed  - Offer frequent toileting  - Follow urinary retention protocol if ordered  Outcome: Progressing  Goal: Absence of urinary retention  Description: INTERVENTIONS:  - Assess patient’s ability to void and empty bladder  - Monitor I/O  - Bladder scan as needed  - Discuss with physician/AP medications to alleviate retention as needed  - Discuss catheterization for long term situations as appropriate  Outcome: Progressing  Goal: Urinary catheter remains patent  Description: INTERVENTIONS:  - Assess patency of urinary catheter  - If patient has a chronic de leon, consider changing catheter if non-functioning  - Follow guidelines for intermittent irrigation of non-functioning urinary catheter  Outcome: Progressing     Problem: METABOLIC, FLUID AND ELECTROLYTES - ADULT  Goal: Electrolytes maintained within normal limits  Description: INTERVENTIONS:  - Monitor labs and assess patient for signs and symptoms of electrolyte imbalances  - Administer electrolyte replacement as ordered  - Monitor response to electrolyte replacements, including repeat lab results as appropriate  - Instruct patient on fluid and nutrition as appropriate  Outcome: Progressing  Goal: Fluid balance maintained  Description: INTERVENTIONS:  - Monitor labs   - Monitor I/O and WT  - Instruct patient on fluid and nutrition as appropriate  - Assess for signs & symptoms of volume excess or deficit  Outcome: Progressing  Goal: Glucose maintained within target range  Description: INTERVENTIONS:  - Monitor Blood Glucose as ordered  - Assess for signs and symptoms of hyperglycemia and hypoglycemia  - Administer ordered medications to maintain glucose within target range  - Assess nutritional intake and initiate nutrition service referral as needed  Outcome: Progressing

## 2023-09-13 NOTE — DISCHARGE SUMMARY
[address]  Discharge- [address] 1995, 33 y.o. female MRN: [id number]  Unit/Bed#: -[facility] Encounter: [id number]  Primary Care Provider: No primary care provider on file. Date and time admitted to hospital: 9/11/2023  5:40 PM    * Hematuria  Assessment & Plan  · Patient without any further hematuria since hospitalization  · CT renal study (non-con) and CT abd/pelvis w/contrast showing significant bladder distention, otherwise negative for acute process (no stones, no hydro)  · Urine culture growing E. Coli  · Blood culture x2 with no growth at 1 day  · Pregnancy negative  · Continue Lee and will be discharged with Lee to follow-up with urology as outpatient    Abdominal distention  Assessment & Plan  · No further distention noted at this time  · CT abdomen pelvis-no significant change from prior study. Moderate proximal colonic stool burden without bowel obstruction  · General surgery did see patient and symptoms secondary to constipation    A-fib ([address])  Assessment & Plan  · Family reports prior hx of afib  · Was previously on coumadin and later eliquis  · Is no longer on AC  · Afib thought to be 2/2 addisons crisis at this time     Asthma  Assessment & Plan  · Does not appear to be in acute exacerbation  · Continue home inhalers    Tonic clonic seizures ([address])  Assessment & Plan  · Prior pseudoseizure hx vs seizure in setting of marcie crisis noted in care everywhere  · Not currently on outpatient antiepileptic     [place]'s disease [facility])  Assessment & Plan  · Continue home hydrocortisone on discharge      Medical Problems     Resolved Problems  Date Reviewed: 9/11/2023   None       Discharging Physician / Practitioner: Luna Fajardo DO  PCP: No primary care provider on file.   Admission Date:   Admission Orders (From admission, onward)     Ordered        09/11/23 2351  INPATIENT ADMISSION  Once                      Discharge Date: 09/13/23    Consultations During Hospital Stay:  · Urology   · General surgery     Reason for Admission: hematuria     Hospital Course:   Anita Lim is a 33 y.o. female patient who originally presented to the hospital on 9/11/2023 due to hematuria. Patient noted to have hematuria which is likely secondary to cystitis as this does improve after antibiotic treatment. Patient did have Lee placed given urinary retention and will follow-up with urology as outpatient for further work-up. Patient is stable for discharge at this time and will be given antibiotics upon discharge to treat urinary tract infection. Plan discussed with patient and significant other. Please see above list of diagnoses and related plan for additional information. Condition at Discharge: stable    Discharge Day Visit / Exam:   Subjective: Patient resting comfortably on examination. Patient had no overnight events or complaints on exam.  Vitals: Blood Pressure: 109/66 (09/13/23 0711)  Pulse: 66 (09/13/23 0711)  Temperature: 97.9 °F (36.6 °C) (09/13/23 0711)  Temp Source: Oral (09/13/23 0711)  Respirations: 18 (09/13/23 0711)  Height: 5' 2" (157.5 cm) (09/11/23 1734)  Weight - Scale: 72.6 kg (160 lb 0.9 oz) (09/11/23 1734)  SpO2: 98 % (09/13/23 0711)  Exam:   Physical Exam  Vitals and nursing note reviewed. Constitutional:       General: She is not in acute distress. Appearance: She is well-developed. HENT:      Head: Normocephalic and atraumatic. Eyes:      General: No scleral icterus. Conjunctiva/sclera: Conjunctivae normal.   Cardiovascular:      Rate and Rhythm: Normal rate and regular rhythm. Heart sounds: Normal heart sounds. No murmur heard. No friction rub. No gallop. Pulmonary:      Effort: Pulmonary effort is normal. No respiratory distress. Breath sounds: Normal breath sounds. No wheezing or rales. Abdominal:      General: Bowel sounds are normal. There is no distension. Palpations: Abdomen is soft. Tenderness: There is no abdominal tenderness. Musculoskeletal:         General: Normal range of motion. Skin:     General: Skin is warm. Findings: No rash. Neurological:      Mental Status: She is alert and oriented to person, place, and time. Discussion with Family: Updated  (significant other) at bedside. Discharge instructions/Information to patient and family:   See after visit summary for information provided to patient and family. Provisions for Follow-Up Care:  See after visit summary for information related to follow-up care and any pertinent home health orders. Disposition:   Home    Planned Readmission: none     Discharge Statement:  I spent 37 minutes discharging the patient. This time was spent on the day of discharge. I had direct contact with the patient on the day of discharge. Greater than 50% of the total time was spent examining patient, answering all patient questions, arranging and discussing plan of care with patient as well as directly providing post-discharge instructions. Additional time then spent on discharge activities. Discharge Medications:  See after visit summary for reconciled discharge medications provided to patient and/or family.       **Please Note: This note may have been constructed using a voice recognition system**

## 2023-09-13 NOTE — PROGRESS NOTES
Progress Note - Kennedy Canales 33 y.o. female MRN: [id number]    Unit/Bed#: -01 Encounter: [id number]      Assessment:  Kendra Iniguez is a 81-year-old female with a E. coli UTI, seen in urologic consultation due to complaints of left flank pain and urinary retention, status post Lee catheter insertion. Evaluated intervally by general surgery for abdominal distention. There was evidence of moderate proximal colonic stool burden on imaging. AFebrile, hemodynamically stable  Interval downward trend of leukocytosis with WBC count today at 10.9 from 13 K. Renal function within normal limits. Recent blood cultures are negative for growth of organism. Lee catheter remains in situ and patent for clear yellow urine    Plan:  · Continue medical optimization, symptom management and empiric antibiosis. · Narrow per sensitivities. · Maintain Lee catheter to straight drainage. Do not remove. · Bowel regimen. · Follow-up for outpatient trial of void Monday, cystoscopic evaluation and urodynamic study in the near future should patient show evidence of refractory retention after bowel evacuation. · No further  intervention indicated this hospital stay. · Office staff will contact patient/caregiver with hospital follow-up appointment date and time once discharged. Subjective:   Denies fever, chills, flank, abdominal or suprapubic pain    Objective:     Vitals: Blood pressure 109/66, pulse 66, temperature 97.9 °F (36.6 °C), temperature source Oral, resp. rate 18, height 5' 2" (1.575 m), weight 72.6 kg (160 lb 0.9 oz), SpO2 98 %. ,Body mass index is 29.27 kg/m².       Intake/Output Summary (Last 24 hours) at 9/13/2023 0841  Last data filed at 9/13/2023 0254  Gross per 24 hour   Intake 2630 ml   Output 1350 ml   Net 1280 ml       Physical Exam: General appearance: alert and oriented, in no acute distress, appears stated age, cooperative and no distress  Head: Normocephalic, without obvious abnormality, atraumatic  Neck: no JVD and supple, symmetrical, trachea midline  Lungs: clear to auscultation bilaterally  Heart: regular rate and rhythm, S1, S2 normal, no murmur, click, rub or gallop  Abdomen: soft, non-tender; bowel sounds normal; no masses,  no organomegaly  Extremities: extremities normal, warm and well-perfused; no cyanosis, clubbing, or edema  Pulses: 2+ and symmetric  Neurologic: Grossly normal  Lee--grossly clear yellow urine     Invasive Devices     Peripheral Intravenous Line  Duration           Peripheral IV 09/11/23 Right Antecubital 1 day          Drain  Duration           Urethral Catheter Three way 22 Fr. 1 day              Lab Results   Component Value Date    WBC 10.90 (H) 09/13/2023    HGB 10.7 (L) 09/13/2023    HCT 33.3 (L) 09/13/2023    MCV 94 09/13/2023     09/13/2023     Lab Results   Component Value Date    SODIUM 138 09/12/2023    K 3.9 09/12/2023     09/12/2023    CO2 26 09/12/2023    BUN 10 09/12/2023    CREATININE 0.48 (L) 09/12/2023    GLUC 132 09/12/2023    CALCIUM 9.2 09/12/2023         Lab, Imaging and other studies: I have personally reviewed pertinent reports.

## 2023-09-13 NOTE — CASE MANAGEMENT
Case Management Assessment    Patient name Chichi Middleton  Location /-94 MRN [id number]  : 1995 Date 2023       Current Admission Date: 2023  Current Admission Diagnosis:Hematuria   Patient Active Problem List    Diagnosis Date Noted   • A-fib [facility]) 2023   • Abdominal distention 2023   • [place]'s disease ([address])    • Tonic clonic seizures ([address])    • Asthma    • Hematuria       LOS (days): 2  Geometric Mean LOS (GMLOS) (days):   Days to GMLOS:     OBJECTIVE:    Risk of Unplanned Readmission Score: 9.54         Current admission status: Inpatient       Preferred Pharmacy:   [address] [place] [address]  Phone: [phone] Fax: [phone]    Primary Care Provider: No primary care provider on file. Primary Insurance: [place] [employer]  Secondary Insurance:     ASSESSMENT:  Active Health Care Proxies    There are no active Health Care Proxies on file.                       Patient Information  Mental Status: Alert

## 2023-09-13 NOTE — PLAN OF CARE
Problem: PAIN - ADULT  Goal: Verbalizes/displays adequate comfort level or baseline comfort level  Description: Interventions:  - Encourage patient to monitor pain and request assistance  - Assess pain using appropriate pain scale  - Administer analgesics based on type and severity of pain and evaluate response  - Implement non-pharmacological measures as appropriate and evaluate response  - Consider cultural and social influences on pain and pain management  - Notify physician/advanced practitioner if interventions unsuccessful or patient reports new pain  Outcome: Progressing     Problem: GASTROINTESTINAL - ADULT  Goal: Minimal or absence of nausea and/or vomiting  Description: INTERVENTIONS:  - Administer IV fluids if ordered to ensure adequate hydration  - Maintain NPO status until nausea and vomiting are resolved  - Nasogastric tube if ordered  - Administer ordered antiemetic medications as needed  - Provide nonpharmacologic comfort measures as appropriate  - Advance diet as tolerated, if ordered  - Consider nutrition services referral to assist patient with adequate nutrition and appropriate food choices  Outcome: Progressing  Goal: Maintains or returns to baseline bowel function  Description: INTERVENTIONS:  - Assess bowel function  - Encourage oral fluids to ensure adequate hydration  - Administer IV fluids if ordered to ensure adequate hydration  - Administer ordered medications as needed  - Encourage mobilization and activity  - Consider nutritional services referral to assist patient with adequate nutrition and appropriate food choices  Outcome: Progressing  Goal: Maintains adequate nutritional intake  Description: INTERVENTIONS:  - Monitor percentage of each meal consumed  - Identify factors contributing to decreased intake, treat as appropriate  - Assist with meals as needed  - Monitor I&O, weight, and lab values if indicated  - Obtain nutrition services referral as needed  Outcome: Progressing  Goal: Establish and maintain optimal ostomy function  Description: INTERVENTIONS:  - Assess bowel function  - Encourage oral fluids to ensure adequate hydration  - Administer IV fluids if ordered to ensure adequate hydration   - Administer ordered medications as needed  - Encourage mobilization and activity  - Nutrition services referral to assist patient with appropriate food choices  - Assess stoma site  - Consider wound care consult   Outcome: Progressing  Goal: Oral mucous membranes remain intact  Description: INTERVENTIONS  - Assess oral mucosa and hygiene practices  - Implement preventative oral hygiene regimen  - Implement oral medicated treatments as ordered  - Initiate Nutrition services referral as needed  Outcome: Progressing     Problem: GENITOURINARY - ADULT  Goal: Maintains or returns to baseline urinary function  Description: INTERVENTIONS:  - Assess urinary function  - Encourage oral fluids to ensure adequate hydration if ordered  - Administer IV fluids as ordered to ensure adequate hydration  - Administer ordered medications as needed  - Offer frequent toileting  - Follow urinary retention protocol if ordered  Outcome: Progressing  Goal: Absence of urinary retention  Description: INTERVENTIONS:  - Assess patient’s ability to void and empty bladder  - Monitor I/O  - Bladder scan as needed  - Discuss with physician/AP medications to alleviate retention as needed  - Discuss catheterization for long term situations as appropriate  Outcome: Progressing  Goal: Urinary catheter remains patent  Description: INTERVENTIONS:  - Assess patency of urinary catheter  - If patient has a chronic de leon, consider changing catheter if non-functioning  - Follow guidelines for intermittent irrigation of non-functioning urinary catheter  Outcome: Progressing     Problem: INFECTION - ADULT  Goal: Absence or prevention of progression during hospitalization  Description: INTERVENTIONS:  - Assess and monitor for signs and symptoms of infection  - Monitor lab/diagnostic results  - Monitor all insertion sites, i.e. indwelling lines, tubes, and drains  - Monitor endotracheal if appropriate and nasal secretions for changes in amount and color  - [place] appropriate cooling/warming therapies per order  - Administer medications as ordered  - Instruct and encourage patient and family to use good hand hygiene technique  - Identify and instruct in appropriate isolation precautions for identified infection/condition  Outcome: Progressing  Goal: Absence of fever/infection during neutropenic period  Description: INTERVENTIONS:  - Monitor WBC    Outcome: Progressing

## 2023-09-13 NOTE — ASSESSMENT & PLAN NOTE
· No further distention noted at this time  · CT abdomen pelvis-no significant change from prior study.   Moderate proximal colonic stool burden without bowel obstruction  · General surgery did see patient and symptoms secondary to constipation

## 2023-09-13 NOTE — TELEPHONE ENCOUNTER
Lucas Estrada is a 81-year-old female seen in urologic consultation at [facility] for urinary retention and hematuria status post Lee catheter insertion. Significant other is ER physician and will remove catheter on Monday. Follow-up with call and schedule second visit for MD discussion followed by cystoscopy. We will need to discuss clinical benefit of urodynamic study if indicated if patient demonstrates refractory retention. Please contact patient with office follow-up appointment date and time once discharged. Patient prefers [facility] office with Dr. Guillermo Pemberton if possible. second option is [facility]. Please discuss preference with patient prior to scheduling. Thank you.

## 2023-09-13 NOTE — ASSESSMENT & PLAN NOTE
· Family reports prior hx of afib  · Was previously on coumadin and later eliquis  · Is no longer on [facility]  · Afib thought to be 2/2 addisons crisis at this time

## 2023-09-13 NOTE — CASE MANAGEMENT
Case Management Progress Note    Patient name Massimo Jeffrey  Location /-62 MRN [id number]  : 1995 Date 2023       LOS (days): 2  Geometric Mean LOS (GMLOS) (days):   Days to GMLOS:        OBJECTIVE:        Current admission status: Inpatient  Preferred Pharmacy:   [address] [place] [address]  Phone: [phone] Fax: [phone]    Primary Care Provider: No primary care provider on file. Primary Insurance: [place] [employer]  Secondary Insurance:     PROGRESS NOTE:      Spoke with attending in rounds this morning and patient is to be discharged. Spoke with patient while she was packing to leave and she relays that she has no CM needs.

## 2023-09-13 NOTE — ASSESSMENT & PLAN NOTE
· Patient without any further hematuria since hospitalization  · CT renal study (non-con) and CT abd/pelvis w/contrast showing significant bladder distention, otherwise negative for acute process (no stones, no hydro)  · Urine culture growing E.  Coli  · Blood culture x2 with no growth at 1 day  · Pregnancy negative  · Continue Lee and will be discharged with Lee to follow-up with urology as outpatient

## 2023-09-14 NOTE — TELEPHONE ENCOUNTER
Patient called stating he phone  and got disconnected when speaking to nurse.  Warm transfer to nurse

## 2023-09-14 NOTE — TELEPHONE ENCOUNTER
Patient calling in for ER f/u she currently has a catheter placed that is supposed to come out on Monday. Her significant other is concerned that she has barely had anything to drink within the last few days and has had to drain the bag at least 6 times in the last four hours. She is requesting call back.          CB: [phone]

## 2023-09-14 NOTE — UTILIZATION REVIEW
URGENT/EMERGENT  INPATIENT/SPU AUTHORIZATION REQUEST    Date: 09/14/23            # Pages in this Request:     X New Request   Additional Information for PA#:     Office Contact Name:  Jairo Kilgore Title: Utilization Review, Registed Nurse     Phone: [phone]  Ext. Availability (Date/Time): Wednesday - Friday 8 am- 4 pm    X Inpatient Review  SPU Review       X Current        Late Pick-up   · How your facility was first notified of the Late Pick-up:   · When your facility was first notified of the Late Pick-up (date):          RECIPIENT INFORMATION    Recipient XA#:[id number]    Recipient Roney Casiano    YOB: 1995  33 y.o. Recipient Alias:     Gender:   Male X Female Medicaid Eligibility ([address] [place] [address]): INSURANCE INFORMATION    (All other private or governmental health insurance benefits must be utilized prior to billing the [place] Program)    Was this admission the result of an MVA, other accident, assault, injury, fall, gunshot, bite etc.? Yes X No                   If yes, provide a brief description of the incident. Does the recipient have other insurance coverage? Yes X No        Insurance Company Name/Policy #      Did that insurance pay on this claim? Yes  No        Did that insurance deny this claim? Yes  No    If yes, reason for denial:      Does the recipient have Medicare? Yes X No        Did Medicare exhaust prior to this admission? Yes  No            Did Medicare partially pay this claim? Yes  No        Did that insurance deny this claim? Yes  No    If yes, reason for denial:          Was the recipient a prisoner at the time of admission?    Yes X No            PROVIDER INFORMATION    [facility]: [facility] ([address] [place] [address] Provider ID#: [id number]    Admitting Physician Name: [facility] Provider ID#: [id number]        ADMISSION INFORMATION    Type of Admission: (please choose one)    X ED      Direct    If yes, from where?     Transfer    If yes, transferring hospital (inpatient, rehab, or psych) Provider Name/Provider ID#: Admission Floor or Unit Type: MED-SURG    Dates/Times:        ED Date/Time: 9/11/2023  1722 PM        Observation Date/Time:          Admission Date/Time: 9/11/23 23:51 PM        Discharge or Transfer Date/Time: 9/13/2023  1059        DIAGNOSIS/PROCEDURE CODES    Primary Diagnosis Code/Primary Diagnosis Code description:   Abdominal pain [R10.9]  Hematuria, gross [R31.0]  (MAY RE-ORDER DX CODES)  Additional Diagnosis Code(s) and Description(s)-(up to three additional codes):     Procedure Code (one) and description:NONE        CLINICAL INFORMATION - PRIOR ADMISSION ONLY    Is there a prior admission with a discharge date within 30 days of the date of this admission? X No (Proceed to the next section - "Clinical Information - General Review Checklist:)      Yes (Provide the following information)     Prior admission dates:    MA Prior Authorization Number:        Review Outcome:     Diagnosis Code(s)/Description:    Procedure Code/Description:    Findings:    Treatment:    Condition on Discharge:   Vitals:    Labs:   Imaging:   Medications: Follow-up Instructions:    Disposition:        CLINICAL INFORMATION - GENERAL REVIEW CHECKLIST    EMERGENCY DEPARTMENT: (Proceed to "ADMISSION" if Direct Admission)    Presenting Signs/Symptoms:   • Flank Pain       L sided flank pain starting today, "Feels like my kidney exploded." Unable to void. Pt reports dieter blood in her urine x2 weeks, had UA last week that did not show infection. Pt reports also having addisons and needs 'hydrocortisone in stressful situations.'   • Contraception       Reports IUD fell out last week         Initial Presentation: 33 y.o. female to the ED from home with complaints of left sided flank pain, blood in urine. Admitted to inpatient for hematuria, addisons disease, afib. H/O afib, asthma, addisions, sizures.   Has had blood in her urine for the past 2 weeks. IUD fell out the week prior. CT a/p  shows no acute abnormalities. No longer on anticoag. Afib could be due to addisons crisis. GIven IV hydrocortisone in the ED. Continue. Urology consult. UA suggests possible UTI. Unclear etiology of hematuria. Started on IV rocephin. Urine cultures pending. Place de leon     Date: 9/12   Day 2:  Continue with IV rocephin. Continues with flank pain.      Urology consult: UA large leukocytes, nitrite negative. Innuemrable bacteria. 10-20 WBC . Urine culture pending. WBCs increased. Continue iv abx. CT with contrast shows:  Stable cortical defects in the right kidney may be secondary to chronic infectious insult. Symmetric nephrographic phase enhancement of the kidneys.  left flank pain slightly improved. Reports utis as a child were untreated.      Gen/surg consult:  No indication for surgical intervention at this time.   Abdomen mildly distended, soft.          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[address])    • Anemia    • Asthma    • Hypokalemia    • Tonic clonic seizures (HCC)        Clinical Exam:     Initial Vital Signs: (Temp, Pulse, Resp, and BP)   ED Triage Vitals   Temperature Pulse Respirations Blood Pressure SpO2   09/11/23 1735 09/11/23 1735 09/11/23 1735 09/11/23 1735 09/11/23 1735   (!) 97.3 °F (36.3 °C) 83 20 134/90 99 %      Temp Source Heart Rate Source Patient Position - Orthostatic VS BP Location FiO2 (%)   09/12/23 0048 09/11/23 1735 09/11/23 1735 09/11/23 1735 --   Oral Monitor Sitting Left arm       Pain Score       09/11/23 1735       9           Pertinent Repeat Vital Signs: (include times they were obtained)  09/12/23 07:21:41 97.9 °F (36.6 °C) 78 -- 121/57 78 96 % -- --   09/12/23 0300 -- -- -- -- -- 96 % None (Room air) --   09/12/23 00:48:37 97.7 °F (36.5 °C) 89 20 123/79 94 94 % None (Room air) Lying   09/11/23 2048 -- 80 17 127/60 -- 95 % None (Room air) Lying   09/11/23 1735 97.3 °F (36.3 °C) Abnormal  83 20 134/90 106 99 % None (Room air) Si       Pertinent Sustained Findings: (include times they were obtained)    Weight in Kilograms:  Wt Readings from Last 1 Encounters:   09/11/23 72.6 kg (160 lb 0.9 oz)       Pertinent Labs (results):  Results from last 7 days   Lab Units 09/12/23  0507 09/11/23  1845   WBC Thousand/uL 13.05* 10.38*   HEMOGLOBIN g/dL 13.2 12.8   HEMATOCRIT % 40.3 38.2   PLATELETS Thousands/uL 262 256   NEUTROS ABS Thousands/µL 11.30* 8.27*                Results from last 7 days   Lab Units 09/12/23  0507 09/11/23  1845   SODIUM mmol/L 138 138   POTASSIUM mmol/L 3.9 3.9   CHLORIDE mmol/L 107 106   CO2 mmol/L 26 24   ANION GAP mmol/L 5 8   BUN mg/dL 10 11   CREATININE mg/dL 0.48* 0.52*   EGFR ml/min/1.73sq m 134 131   CALCIUM mg/dL 9.2 9.7           Results from last 7 days   Lab Units 09/11/23  1845   AST U/L 12*   ALT U/L 11   ALK PHOS U/L 57   TOTAL PROTEIN g/dL 7.0   ALBUMIN g/dL 4.4   TOTAL BILIRUBIN mg/dL 0.48                Results from last 7 days   Lab Units 09/12/23  0507 09/11/23  1845   GLUCOSE RANDOM mg/dL 132 90              Results from last 7 days   Lab Units 09/11/23  2326   LACTIC ACID mmol/L 0.4*               Results from last 7 days   Lab Units 09/12/23  0230 09/11/23  1918   CLARITY UA   Clear Turbid   COLOR UA   Colorless Light Orange   SPEC GRAV UA   >=1.050* 1.007   PH UA   6.5 6.0   GLUCOSE UA mg/dl Negative Negative   KETONES UA mg/dl 60 (2+)* 10 (1+)*   BLOOD UA   Small* Large*   PROTEIN UA mg/dl Negative 30 (1+)*   NITRITE UA   Positive* Negative   BILIRUBIN UA   Negative Negative   UROBILINOGEN UA (BE) mg/dl <2.0 <2.0   LEUKOCYTES UA   Negative Large*   WBC UA /hpf 1-2 10-20*   RBC UA /hpf 2-4* Innumerable*   BACTERIA UA /hpf None Seen Innumerable*   EPITHELIAL CELLS WET PREP /hpf Occasional Innumerable*           Results from last 7 days   Lab Units 09/12/23  0012   BLOOD CULTURE   Received in Microbiology Lab. Culture in Progress. Received in Microbiology Lab. Culture in Progress.             Radiology (results):  CT abdomen pelvis with contrast   Final Result by Claudio Baker MD (09/12 0014)       No evidence of acute intra-abdominal or pelvic process.               Workstation performed: [id number]           CT renal stone study abdomen pelvis wo contrast   Final Result by Maria E Gottlieb MD (09/11 2049)       No nephroureterolithiasis. No hydronephrosis. If patient's gross hematuria persists, consider dedicated CT renal mass protocol to assess for underlying abnormal enhancement.       No acute abnormality to account for patient's complaint of pain.               Workstation performed: [id number]           XR abdomen 1 view kub   Final Result by Mario Daniel MD (09/12 4590)       No radiopaque urinary tract calculi. EKG (results):      Other tests (results):    Tests pending final results:    Treatment in the ED:   Medication Administration from 09/11/2023 1722 to 09/12/2023 0044       Date/Time Order Dose Route Action Action by Comments     09/11/2023 1846 EDT ketorolac (TORADOL) injection 30 mg 30 mg Intravenous Given       09/11/2023 1846 EDT hydrocortisone (Solu-CORTEF) injection 100 mg 100 mg Intravenous Given                  09/11/2023 1853 EDT sodium chloride 0.9 % bolus 1,000 mL 1,000 mL Intravenous New Bag       09/11/2023 2016 EDT morphine injection 6 mg 6 mg Intravenous Given       09/11/2023 2016 EDT ondansetron (ZOFRAN) injection 4 mg 4 mg Intravenous Given       09/11/2023 2212 EDT ketorolac (TORADOL) injection 30 mg 30 mg Intravenous Given                  09/11/2023 2223 EDT sodium chloride 0.9 % bolus 1,000 mL 1,000 mL Intravenous New Bag       09/11/2023 2212 EDT HYDROmorphone (DILAUDID) injection 0.5 mg 0.5 mg Intravenous Given                  09/12/2023 0028 EDT cefTRIAXone (ROCEPHIN) IVPB (premix in dextrose) 1,000 mg 50 mL 1,000 mg Intravenous New Bag       09/12/2023 0025 EDT HYDROmorphone (DILAUDID) injection 1 mg 1 mg Intravenous Given             Meds: Name, dose, route, time, number of doses given        Nebulizer treatments: Type, total number given      IVs: Type, rate, total amt. given          Other treatments:       Change in condition while in the ED:       Response to ED Treatment:           OBSERVATION: (Proceed to "ADMISSION" if Direct Admission)    Orders written during the observation period  Meds Name, dose, route, time, how may doses given:  PRN Meds Name, dose, route, time, how many doses given within the first 24 hrs.:  IVs Type, rate, and total amt. ordered/given:  Labs, imaging, other:  Consults and findings:    Test Results during the observation period  Pertinent Lab tests (dates/results):  Culture results (blood, urine, spinal, wound, respiratory, etc.):  Imaging tests (dates/results):  EKG (dates/results):   Other test (dates/results):  Tests pending (dates/results):    Surgical or Invasive Procedures during the observation period  Name of surgery/procedure:  Date & Time:  Patient Response:  Post-operative orders:  Operative Report/Findings:    Response to Treatment, Major Change in Condition, Major Charge in Treatment during the observation period          ADMISSION:    DIRECT Admissions Only:    · Presenting Signs/Symptoms:   ·   · Medication/treatment prior to arrival:  ·   · Past Medical History:  ·   · Clinical Exam on admission:  ·   · Vital Signs on admission: (Temp, Pulse, Resp, and BP)  ·   · Weight in kilograms:     ALL Admissions:    Admission Orders and Other Orders written within the first 24 hrs after admission  IP CONSULT TO UROLOGY    Meds Name, dose, route, time, how may doses given:  budesonide-formoterol, 2 puff, Inhalation, BID  cefTRIAXone, 1,000 mg, Intravenous, Q24H  hydrocortisone sodium succinate, 50 mg, Intravenous, Q6H LINDA  lidocaine, 1 patch, Topical, Daily  montelukast, 10 mg, Oral, HS        Continuous IV Infusions:  sodium chloride, 100 mL/hr, Intravenous, Continuous        PRN Meds:  acetaminophen, 650 mg, Oral, Q6H PRN  HYDROmorphone, 0.5 mg, Intravenous, Q4H PRN  HYDROmorphone, 1 mg, Intravenous, Q4H PRN x2 9/12       Labs, imaging, other:      Consults and findings:  Hematuria  Assessment & Plan  Patient reports dieter blood in her urine for the past two weeks. Today she began to have difficulty urinating and developed severe left flank pain described as "I feel like my kidney exploded" earlier today. She receives most of her care at [facility] and reports prior hx of marcie crisis requiring intubation. Patient is tearful and reports continued ongoing left flank pain at this time.     /60 (BP Location: Left arm)   Pulse 80   Temp (!) 97.3 °F (36.3 °C)   Resp 17   Ht 5' 2" (1.575 m)   Wt 72.6 kg (160 lb 0.9 oz)   SpO2 95%   BMI 29.27 kg/m²      • Reports dieter blood in urine x2weeks  • Today began having difficulty urinating and left flank pain  • Has been able to urinate in the ED without difficulty   • CT renal study (non-con) and CT abd/pelvis w/contrast showing significant bladder distention, otherwise negative for acute process (no stones, no hydro)  • UA suggestive of possible UTI; not meeting SEPSIS criteria   • Pregnancy negative   • No SHARMILA; not meeting SIRS  • Unsure of cause currently; UTI possibly explains hematuria? However unsure given degree of blood and associated left flank pain? (Possible radiolucent stone vs recently passed stone?  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[facility])  Assessment & Plan  • Given 100mg IV hydrocortisone in ED   • Will continue stress dose 50mg IV hydrocortixone q6H overnight while pending further review      A-fib [facility])  Assessment & Plan  • Family reports prior hx of afib  • Was previously on coumadin and later eliquis  • Is no longer on AC  • Afib thought to be 2/2 addisons crisis at this time      Asthma  Assessment & Plan  • Does not appear to be in acute exacerbation  • Continue prehospital inhalers   • Monitor respiratory status      Tonic clonic seizures ([address])  Assessment & Plan  • Prior pseudoseizure hx vs seizure in setting of marcie crisis noted in care everywhere  • Not currently on outpatient antiepileptic     Consult - Urology   [address] 1995, 33 y.o. female MRN: [id number]     Unit/Bed#: -01 Encounter: [id number]     Assessment & Plan:  1. Hematuria  2. L flank pain  - Hematuria has been ongoing for past two weeks- light punch colored urine, no clots. Presented to ED with difficulty urinating. De Leon placed in ED for 700 mL urine return. L flank pain acute onset yesterday- constant pain. - UA large leukocytes, nitrite negative. Innuemrable bacteria. 10-20 WBC  - Urine culture pending. On ceftriaxone  - Leukocytosis 13.05, yesterday 10.38  - Cr at baseline  - CT scan was done showing single nonobstructing stone in right lower pole, no hydronephrosis. Urinary bladder moderately distention. CT with contrast done showing Stable cortical defects in the right kidney may be secondary to chronic infectious insult. Symmetric nephrographic phase enhancement of the kidneys. No obstructive uropathy. Significant bladder distention  - De Leon catheter was placed for 700 mL return punch clear urine  - Urine this AM clear yellow  - L flank pain improved slightly. - Possible UTI causing gross hematuria. F/u urine culture. - Continue with pain regimen  - At baseline patient with voiding dysfunction. No formal urology evaluation. Reports no urge to urinate. Urinates 2 times daily. Chronic overdistention.    - Recommend maintain de leon catheter on discharge  - Urology will continue to follow.   Consult- General Surgery   Lysle Luis E Ally 33 y.o. female MRN: [id number]  Unit/Bed#: -01 Encounter: [id number]           Assessment/Plan   Gates Bosworth is a 33 y.o. female     [facility]  General surgery consulted for abdominal pain and distension  CT of the abdomen and pelvis obtained, awaiting final read, wet read shows mild colonic distension secondary to stool burden consistent with constipation   CT of the abdomen and pelvis obtained 9/11 showed no evidence of acute intraabdominal or pelvic process     No indication for surgical intervention, suspect abdominal pain and bloating secondary to constipation given significant stool burden noted on imaging. No evidence of an acute surgical issue on imaging and abdominal exam remains benign. Abdomen is mildly distended but soft, no guarding or rebound, no peritoneal signs. Recommend potential GI evaluation and initiation of bowel regimen   Serial abdominal exam  Pain control/antiemetics PRN  SLIM primary, General surgery will sign off at this time. Please contact if any further questions or concerns arise       Test Results after admission  Pertinent Lab tests (dates/results):  Culture results (blood, urine, spinal, wound, respiratory, etc.):  Imaging tests (dates/results):  EKG (dates/results):   Other test (dates/results):  Tests pending (dates/results):    Surgical or Invasive Procedures  Name of surgery/procedure:  Date & Time:  Patient Response:  Post-operative orders:  Operative Report/Findings:    Response to Treatment, Major Change in Condition, Major Charge in Treatment anytime during admission    Disposition on Discharge  Home, Rehab, SNF, LTC, Shelter, etc.: Home/Self Care    Cease to Breathe (CTB)  If a patient expires during an admission, in addition to the above information, please include:    Summary/timeline of the patient's decline in condition:    Medications and treatment:    Patient response to treatment:    Date and time patient ceased to breathe:        Is there a Readmission that follows this admission? Yes X No    If yes, reason for denial:          InterQual Review    InterQual Criteria Met:  Yes X No  N/A        Please include the InterQual Review, InterQual year/version used, and the criteria selected: InterQual® Review Status: In Primary  Review Type: Admission  Criteria Status: Not Met  Day of review: Episode Day 1  Condition Specific: Yes        REVIEW DETAILS     Product: Ericka Peers Adult  Subset: Infection: Pyelonephritis or Complex UTI        Select Day, One:          [  ] Episode Day 1, One:              [  ] ACUTE, All:                  [X] Urinary symptoms with abnormal urinalysis and, >= One:                      [X] >= 10 WBC/mm3                      [X] Leukocyte esterase positive                      [X] Nitrite positive                  [X] Urine culture pending and, Both:                      [X] Anti-infective                      [X] IV fluid        Version: InterQual® 2023, Mar. 2023 Release          PLEASE SUBMIT THE COMPLETED FORM TO THE DEPARTMENT OF HUMAN SERVICES - DIVISION OF CLINICAL  REVIEW VIA FAX [phone] or VIA E-MAIL TO [email or web address]    Signature: Ángela Stephens Date:  09/14/23    Confidentiality Notice: The documents accompanying this telecopy may contain confidential information belonging to the sender. The information is intended only for the use of the individual named above. If you are not the intended recipient, you are hereby notified  That any disclosure, copying, distribution or taking of any telecopy is strictly prohibited.

## 2023-09-14 NOTE — TELEPHONE ENCOUNTER
Spoke w/pt I did transfer pt to the [facility] office and if they can't get her in we will see her here.

## 2023-09-14 NOTE — TELEPHONE ENCOUNTER
Contacted and spoke with patient's boyfriend, Ashley Salcido, who is a MD practices in [facility]. Ashley Anoopmelina briefly described patient's past medical history including Mario Rice had egg retrieval about 3 years ago and patient developed urinary retention. Patient never worked up for the retention issue. Patient admitted to [facility]  09/11/2023 for gross hematuria and urinary retention. Discharged home with de leon catheter. Plan for Ashley Omari will remove de leon about 0600 on 9/19/2023 and patient  scheduled  with Dr Bernie Alexandre on the 19th at 36. Office address given to patient.

## 2023-09-17 LAB
BACTERIA BLD CULT: NORMAL
BACTERIA BLD CULT: NORMAL

## 2023-09-19 ENCOUNTER — TELEPHONE (OUTPATIENT)
Dept: UROLOGY | Facility: CLINIC | Age: 28
End: 2023-09-19

## 2023-09-19 ENCOUNTER — OFFICE VISIT (OUTPATIENT)
Dept: UROLOGY | Facility: CLINIC | Age: 28
End: 2023-09-19
Payer: COMMERCIAL

## 2023-09-19 VITALS — BODY MASS INDEX: 29.45 KG/M2 | WEIGHT: 161 LBS

## 2023-09-19 DIAGNOSIS — N20.0 NEPHROLITHIASIS: ICD-10-CM

## 2023-09-19 DIAGNOSIS — R33.9 URINE RETENTION: Primary | ICD-10-CM

## 2023-09-19 DIAGNOSIS — Q61.5 MEDULLARY SPONGE KIDNEY: ICD-10-CM

## 2023-09-19 DIAGNOSIS — R31.0 GROSS HEMATURIA: ICD-10-CM

## 2023-09-19 LAB
POST-VOID RESIDUAL VOLUME, ML POC: 532 ML
SL AMB  POCT GLUCOSE, UA: NORMAL
SL AMB LEUKOCYTE ESTERASE,UA: NORMAL
SL AMB POCT BILIRUBIN,UA: NORMAL
SL AMB POCT BLOOD,UA: NORMAL
SL AMB POCT CLARITY,UA: CLEAR
SL AMB POCT COLOR,UA: CLEAR
SL AMB POCT KETONES,UA: NORMAL
SL AMB POCT NITRITE,UA: NORMAL
SL AMB POCT PH,UA: 5
SL AMB POCT SPECIFIC GRAVITY,UA: 1.01
SL AMB POCT URINE PROTEIN: NORMAL
SL AMB POCT UROBILINOGEN: 0.2

## 2023-09-19 PROCEDURE — 81002 URINALYSIS NONAUTO W/O SCOPE: CPT | Performed by: UROLOGY

## 2023-09-19 PROCEDURE — 99204 OFFICE O/P NEW MOD 45 MIN: CPT | Performed by: UROLOGY

## 2023-09-19 PROCEDURE — 51798 US URINE CAPACITY MEASURE: CPT | Performed by: UROLOGY

## 2023-09-19 RX ORDER — LEVETIRACETAM 1000 MG/1
1000 TABLET ORAL 2 TIMES DAILY
COMMUNITY
Start: 2023-07-07

## 2023-09-19 RX ORDER — HYDROCORTISONE 5 MG/1
TABLET ORAL
COMMUNITY
Start: 2023-07-26

## 2023-09-19 NOTE — PATIENT INSTRUCTIONS
Do self-catheterization at home, do it at least every 4-5 hours if you are not urinating on your own. If you urinate on your own, catheterize yourself afterwards to see how much is left over. When you are consistently urinating mostly on your own and your residuals are less than 90 cc, stop self-catheterization. If you do not see improvement in another 2 weeks, let me know and I will send in bethanech to the pharmacy to see if we can improve things. We will be calling you to set up urodynamics and cystoscopy here in the office. The urodynamics is done in our [facility] office.
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
## 2023-09-19 NOTE — TELEPHONE ENCOUNTER
Script for intermittent catheters faxed to [facility] along with office note and patients demographic information. Fax confirmation received. Will have script scanned into chart.

## 2023-09-20 ENCOUNTER — PROCEDURE VISIT (OUTPATIENT)
Dept: UROLOGY | Facility: AMBULATORY SURGERY CENTER | Age: 28
End: 2023-09-20
Payer: COMMERCIAL

## 2023-09-20 VITALS
WEIGHT: 161 LBS | RESPIRATION RATE: 18 BRPM | OXYGEN SATURATION: 100 % | HEIGHT: 62 IN | BODY MASS INDEX: 29.63 KG/M2 | DIASTOLIC BLOOD PRESSURE: 82 MMHG | HEART RATE: 97 BPM | SYSTOLIC BLOOD PRESSURE: 130 MMHG

## 2023-09-20 DIAGNOSIS — R33.9 URINE RETENTION: ICD-10-CM

## 2023-09-20 DIAGNOSIS — R31.0 GROSS HEMATURIA: Primary | ICD-10-CM

## 2023-09-20 PROCEDURE — 52000 CYSTOURETHROSCOPY: CPT | Performed by: UROLOGY

## 2023-09-20 PROCEDURE — 88112 CYTOPATH CELL ENHANCE TECH: CPT | Performed by: STUDENT IN AN ORGANIZED HEALTH CARE EDUCATION/TRAINING PROGRAM

## 2023-09-20 PROCEDURE — 99214 OFFICE O/P EST MOD 30 MIN: CPT | Performed by: UROLOGY

## 2023-09-20 RX ORDER — TIZANIDINE 4 MG/1
4 TABLET ORAL DAILY
COMMUNITY

## 2023-09-20 RX ORDER — BUDESONIDE AND FORMOTEROL FUMARATE DIHYDRATE 160; 4.5 UG/1; UG/1
2 AEROSOL RESPIRATORY (INHALATION) 2 TIMES DAILY
COMMUNITY

## 2023-09-20 RX ORDER — ASPIRIN 81 MG/1
81 TABLET, CHEWABLE ORAL DAILY
COMMUNITY

## 2023-09-20 NOTE — Clinical Note
I scoped her and saw clear urine from both ureteral orifice ease and bladder did not have any lesions other than some catheter cystitis. I think your plan for UDS and left ureteroscopy are good next steps.
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
## 2023-09-21 ENCOUNTER — PROCEDURE VISIT (OUTPATIENT)
Dept: UROLOGY | Facility: CLINIC | Age: 28
End: 2023-09-21
Payer: COMMERCIAL

## 2023-09-21 DIAGNOSIS — R33.9 URINE RETENTION: ICD-10-CM

## 2023-09-21 LAB
SL AMB  POCT GLUCOSE, UA: NEGATIVE
SL AMB LEUKOCYTE ESTERASE,UA: NEGATIVE
SL AMB POCT BILIRUBIN,UA: NEGATIVE
SL AMB POCT BLOOD,UA: NEGATIVE
SL AMB POCT CLARITY,UA: ABNORMAL
SL AMB POCT COLOR,UA: YELLOW
SL AMB POCT KETONES,UA: NEGATIVE
SL AMB POCT NITRITE,UA: POSITIVE
SL AMB POCT PH,UA: 8.5
SL AMB POCT SPECIFIC GRAVITY,UA: 1
SL AMB POCT URINE PROTEIN: NEGATIVE
SL AMB POCT UROBILINOGEN: NEGATIVE

## 2023-09-21 PROCEDURE — 87186 SC STD MICRODIL/AGAR DIL: CPT | Performed by: UROLOGY

## 2023-09-21 PROCEDURE — 81002 URINALYSIS NONAUTO W/O SCOPE: CPT

## 2023-09-21 PROCEDURE — 51726 COMPLEX CYSTOMETROGRAM: CPT

## 2023-09-21 PROCEDURE — 87147 CULTURE TYPE IMMUNOLOGIC: CPT | Performed by: UROLOGY

## 2023-09-21 PROCEDURE — 51784 ANAL/URINARY MUSCLE STUDY: CPT

## 2023-09-21 PROCEDURE — 87077 CULTURE AEROBIC IDENTIFY: CPT | Performed by: UROLOGY

## 2023-09-21 PROCEDURE — 87086 URINE CULTURE/COLONY COUNT: CPT | Performed by: UROLOGY

## 2023-09-21 NOTE — PROGRESS NOTES
CC: "sometimes I can empty if I strain, but it's not a lot. I catheterize myself every 4-6 hours"    Doesn't get an urge to void    Last CIC was 10 am today    Unable to void prior to test, straight catheterized for 255 ml   Urine dip + for nitrites, neg wbc, neg rbc. Urine culture sent. Reviewed with Alex Burns ok to do test patient asymptomatic    CMG:       Position:  sitting           Fill sensation:   Not felt       First urge:        Not felt             Normal urge:    Not felt            Must urge:        Not felt         Permission to void:   750 ml,     pdet 0 cmH2O         Max pdet during void attempt:   0   cm H2O       Voided volume:   0 ml    Bladder stability:   stable               Compliance:  normal         EMG activity:       Normal during filling,        normal during void attempts    Comments:   Decreased sensation, no urges to void and no sensation of bladder filling, no pressure in bladder noted during testing   No detrusor activity during multiple void attempts   Straight catheterized for 850 ml post test      Urodynamics    Date/Time: 9/21/2023 1:00 PM    Performed by: Regis Tabor RN  Authorized by: Shweta Love MD  Universal Protocol:  Consent: Verbal consent obtained. Written consent obtained.   Risks and benefits: risks, benefits and alternatives were discussed  Consent given by: patient  Patient understanding: patient states understanding of the procedure being performed  Patient consent: the patient's understanding of the procedure matches consent given  Procedure consent: procedure consent matches procedure scheduled  Patient identity confirmed: verbally with patient    Procedure - Urodynamics:  Procedure details: CMG and EMG    Voiding Pressure Study: No      Post-procedure:     Patient tolerance: Patient tolerated procedure well with no immediate complications

## 2023-09-22 PROCEDURE — 88112 CYTOPATH CELL ENHANCE TECH: CPT | Performed by: STUDENT IN AN ORGANIZED HEALTH CARE EDUCATION/TRAINING PROGRAM

## 2023-09-23 PROBLEM — R33.9 URINE RETENTION: Status: ACTIVE | Noted: 2023-09-23

## 2023-09-23 NOTE — ASSESSMENT & PLAN NOTE
The patient likely has chronic urinary retention which has only become clinically apparent during recent hospitalization. Etiology is not clear. There was no evidence for obstructive pathology on cystoscopy today. I agree with plan for urodynamics to better define.

## 2023-09-23 NOTE — ASSESSMENT & PLAN NOTE
The etiology of the patient's hematuria is not clear. It may be associated from infection seen during hospitalization. Urine efflux in the left side today was clear although this is not definitive for ruling out left upper tract pathology since she could have intermittent bleeding.     I think it is worthwhile to move forward with left-sided ureteroscopy as Dr. Gayle Chapa suggested given her continued hematuria and flank pain

## 2023-09-24 LAB
BACTERIA UR CULT: ABNORMAL

## 2023-09-27 ENCOUNTER — TELEPHONE (OUTPATIENT)
Dept: UROLOGY | Facility: AMBULATORY SURGERY CENTER | Age: 28
End: 2023-09-27

## 2023-09-27 ENCOUNTER — NURSE TRIAGE (OUTPATIENT)
Age: 28
End: 2023-09-27

## 2023-09-27 DIAGNOSIS — R31.0 HEMATURIA, GROSS: Primary | ICD-10-CM

## 2023-09-27 DIAGNOSIS — N30.01 ACUTE CYSTITIS WITH HEMATURIA: ICD-10-CM

## 2023-09-27 RX ORDER — AMOXICILLIN 500 MG/1
500 CAPSULE ORAL EVERY 8 HOURS SCHEDULED
Qty: 21 CAPSULE | Refills: 0 | Status: SHIPPED | OUTPATIENT
Start: 2023-09-27 | End: 2023-10-04

## 2023-09-27 NOTE — TELEPHONE ENCOUNTER
Returned call to Mary [facility] Del Angel and reviewed Ally Hernandez PA-C's note and recommendations. Patient will increase hydration and start taking antibiotic as ordered. Continue straight catheterizing as ordered as well. ER precautions reviewed should patient develop fevers/chills, or blood clots that would block straight catheter and prevent her from draining bladder with self catheterizing. Understanding verbalized.

## 2023-09-27 NOTE — TELEPHONE ENCOUNTER
----- Message from Dinesh Carr MD sent at 9/27/2023 10:07 AM EDT -----  Thank you Dr. Floridalma Hoyt. To my staff, please let the patient know that I sent amoxicillin to her pharmacy. I have already placed the order.

## 2023-09-27 NOTE — TELEPHONE ENCOUNTER
Called patient and left a detailed vm, per communication consent. I informed the pt that her urine testing came back positive for infection and that amoxicillin was sent to her pharmacy. Left office number in case of any questions.

## 2023-09-27 NOTE — TELEPHONE ENCOUNTER
reviewed history and images sent by pt  recent cysto and uds  cause of hematuria remains unclear  has plan to discuss URS  good news cytology negative/benign  urine culture+ start amoxicillin sent by dr Corinne Hickory this morning

## 2023-09-27 NOTE — TELEPHONE ENCOUNTER
Pt called and stated she self cathed and its just straight blood almost purple in color and clots are present.   Pt was transferred to triage     Pt call [phone]

## 2023-09-27 NOTE — TELEPHONE ENCOUNTER
Call transferred. Pt reports straight cath today and had straight blood and clots. Unsure what to do. Reports very dark red almost purple. Denies any pain or discomfort. But reports usually doesn't have any other symptoms. Pt uploading image to [address] [place] [address]. Image uploaded please review and advise.  Pt has not started antibiotics yet, reports good hydration     Reason for Disposition  • All other patients with blood in urine (Exception: could be normal menstrual bleeding)    Protocols used: URINE - BLOOD IN-ADULT-OH

## 2023-09-29 ENCOUNTER — TELEPHONE (OUTPATIENT)
Dept: UROLOGY | Facility: CLINIC | Age: 28
End: 2023-09-29

## 2023-09-29 NOTE — TELEPHONE ENCOUNTER
----- Message from Valentina Canales sent at 9/29/2023 11:40 AM EDT -----  Regarding: Catheter   Contact: [phone]  Hello I need a copy of my catheter prescription so I can order some

## 2023-10-09 ENCOUNTER — OFFICE VISIT (OUTPATIENT)
Dept: UROLOGY | Facility: CLINIC | Age: 28
End: 2023-10-09

## 2023-10-09 VITALS
HEIGHT: 62 IN | BODY MASS INDEX: 29.85 KG/M2 | OXYGEN SATURATION: 100 % | TEMPERATURE: 98.1 F | SYSTOLIC BLOOD PRESSURE: 118 MMHG | HEART RATE: 80 BPM | WEIGHT: 162.2 LBS | RESPIRATION RATE: 17 BRPM | DIASTOLIC BLOOD PRESSURE: 70 MMHG

## 2023-10-09 DIAGNOSIS — N20.0 RENAL CALCULUS, RIGHT: ICD-10-CM

## 2023-10-09 DIAGNOSIS — R31.0 GROSS HEMATURIA: ICD-10-CM

## 2023-10-09 DIAGNOSIS — R33.9 URINE RETENTION: Primary | ICD-10-CM

## 2023-10-09 LAB
SL AMB  POCT GLUCOSE, UA: NORMAL
SL AMB LEUKOCYTE ESTERASE,UA: NORMAL
SL AMB POCT BILIRUBIN,UA: NORMAL
SL AMB POCT BLOOD,UA: NORMAL
SL AMB POCT CLARITY,UA: NORMAL
SL AMB POCT COLOR,UA: YELLOW
SL AMB POCT KETONES,UA: NORMAL
SL AMB POCT NITRITE,UA: NORMAL
SL AMB POCT PH,UA: 7.5
SL AMB POCT SPECIFIC GRAVITY,UA: 1.01
SL AMB POCT URINE PROTEIN: NORMAL
SL AMB POCT UROBILINOGEN: 0.2

## 2023-10-09 PROCEDURE — 99214 OFFICE O/P EST MOD 30 MIN: CPT | Performed by: UROLOGY

## 2023-10-09 PROCEDURE — 81002 URINALYSIS NONAUTO W/O SCOPE: CPT | Performed by: UROLOGY

## 2023-10-09 NOTE — TELEPHONE ENCOUNTER
Pt has an appointment today and she wants to change it to a VV if possible due to her having her child and she lives [place] half away.      PT can be reached at [phone]
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
## 2023-10-19 ENCOUNTER — TELEPHONE (OUTPATIENT)
Dept: UROLOGY | Facility: CLINIC | Age: 28
End: 2023-10-19

## 2023-10-19 ENCOUNTER — PREP FOR PROCEDURE (OUTPATIENT)
Dept: UROLOGY | Facility: CLINIC | Age: 28
End: 2023-10-19

## 2023-10-19 DIAGNOSIS — R39.89 SUSPECTED UTI: ICD-10-CM

## 2023-10-19 DIAGNOSIS — Z01.818 ENCOUNTER FOR PREADMISSION TESTING: Primary | ICD-10-CM

## 2023-10-19 NOTE — TELEPHONE ENCOUNTER
Lmom offering next available OR date with Dr Quin Kruse 12/6 at [facility]

## 2024-09-03 ENCOUNTER — HOSPITAL ENCOUNTER (EMERGENCY)
Dept: HOSPITAL 99 - EMR | Age: 29
Discharge: HOME | End: 2024-09-03
Payer: COMMERCIAL

## 2024-09-03 VITALS — DIASTOLIC BLOOD PRESSURE: 72 MMHG | SYSTOLIC BLOOD PRESSURE: 111 MMHG | RESPIRATION RATE: 18 BRPM

## 2024-09-03 VITALS — SYSTOLIC BLOOD PRESSURE: 113 MMHG | DIASTOLIC BLOOD PRESSURE: 79 MMHG | RESPIRATION RATE: 20 BRPM

## 2024-09-03 DIAGNOSIS — Z88.1: ICD-10-CM

## 2024-09-03 DIAGNOSIS — R26.2: ICD-10-CM
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
## 2024-09-03 DIAGNOSIS — E27.1: ICD-10-CM

## 2024-09-03 DIAGNOSIS — Z90.49: ICD-10-CM

## 2024-09-03 DIAGNOSIS — Z88.8: ICD-10-CM

## 2024-09-03 DIAGNOSIS — R56.9: ICD-10-CM

## 2024-09-03 DIAGNOSIS — E28.2: ICD-10-CM

## 2024-09-03 DIAGNOSIS — I48.91: ICD-10-CM

## 2024-09-03 DIAGNOSIS — J45.909: ICD-10-CM

## 2024-09-03 DIAGNOSIS — Z86.16: ICD-10-CM

## 2024-09-03 DIAGNOSIS — S90.31XA: Primary | ICD-10-CM

## 2024-09-03 DIAGNOSIS — D64.9: ICD-10-CM

## 2024-09-03 PROCEDURE — 29515 APPLICATION SHORT LEG SPLINT: CPT

## 2024-09-03 PROCEDURE — 99283 EMERGENCY DEPT VISIT LOW MDM: CPT

## 2024-09-04 ENCOUNTER — HOSPITAL ENCOUNTER (INPATIENT)
Dept: HOSPITAL 99 - ICU | Age: 29
LOS: 2 days | Discharge: HOME | DRG: 101 | End: 2024-09-06
Payer: COMMERCIAL

## 2024-09-04 VITALS — RESPIRATION RATE: 13 BRPM | SYSTOLIC BLOOD PRESSURE: 93 MMHG | DIASTOLIC BLOOD PRESSURE: 53 MMHG

## 2024-09-04 VITALS — RESPIRATION RATE: 13 BRPM

## 2024-09-04 VITALS — SYSTOLIC BLOOD PRESSURE: 97 MMHG | RESPIRATION RATE: 16 BRPM | DIASTOLIC BLOOD PRESSURE: 80 MMHG

## 2024-09-04 VITALS — RESPIRATION RATE: 12 BRPM | DIASTOLIC BLOOD PRESSURE: 90 MMHG | SYSTOLIC BLOOD PRESSURE: 119 MMHG

## 2024-09-04 VITALS — DIASTOLIC BLOOD PRESSURE: 59 MMHG | RESPIRATION RATE: 17 BRPM | SYSTOLIC BLOOD PRESSURE: 89 MMHG

## 2024-09-04 VITALS — SYSTOLIC BLOOD PRESSURE: 92 MMHG | RESPIRATION RATE: 14 BRPM | DIASTOLIC BLOOD PRESSURE: 57 MMHG

## 2024-09-04 VITALS — RESPIRATION RATE: 20 BRPM

## 2024-09-04 VITALS — DIASTOLIC BLOOD PRESSURE: 72 MMHG | SYSTOLIC BLOOD PRESSURE: 108 MMHG | RESPIRATION RATE: 18 BRPM

## 2024-09-04 VITALS — RESPIRATION RATE: 13 BRPM | SYSTOLIC BLOOD PRESSURE: 118 MMHG | DIASTOLIC BLOOD PRESSURE: 80 MMHG

## 2024-09-04 VITALS — RESPIRATION RATE: 12 BRPM

## 2024-09-04 VITALS — DIASTOLIC BLOOD PRESSURE: 65 MMHG | RESPIRATION RATE: 18 BRPM | SYSTOLIC BLOOD PRESSURE: 98 MMHG

## 2024-09-04 VITALS — RESPIRATION RATE: 15 BRPM

## 2024-09-04 VITALS — RESPIRATION RATE: 17 BRPM | DIASTOLIC BLOOD PRESSURE: 63 MMHG | SYSTOLIC BLOOD PRESSURE: 101 MMHG

## 2024-09-04 VITALS — DIASTOLIC BLOOD PRESSURE: 40 MMHG | SYSTOLIC BLOOD PRESSURE: 81 MMHG | RESPIRATION RATE: 11 BRPM

## 2024-09-04 VITALS — SYSTOLIC BLOOD PRESSURE: 113 MMHG | DIASTOLIC BLOOD PRESSURE: 81 MMHG | RESPIRATION RATE: 21 BRPM

## 2024-09-04 VITALS — RESPIRATION RATE: 17 BRPM

## 2024-09-04 VITALS — RESPIRATION RATE: 19 BRPM

## 2024-09-04 VITALS — DIASTOLIC BLOOD PRESSURE: 49 MMHG | SYSTOLIC BLOOD PRESSURE: 94 MMHG | RESPIRATION RATE: 13 BRPM

## 2024-09-04 VITALS — SYSTOLIC BLOOD PRESSURE: 99 MMHG | DIASTOLIC BLOOD PRESSURE: 62 MMHG | RESPIRATION RATE: 16 BRPM

## 2024-09-04 VITALS — SYSTOLIC BLOOD PRESSURE: 108 MMHG | DIASTOLIC BLOOD PRESSURE: 68 MMHG | RESPIRATION RATE: 21 BRPM

## 2024-09-04 VITALS — RESPIRATION RATE: 11 BRPM

## 2024-09-04 VITALS — RESPIRATION RATE: 16 BRPM

## 2024-09-04 VITALS — SYSTOLIC BLOOD PRESSURE: 110 MMHG | RESPIRATION RATE: 19 BRPM | DIASTOLIC BLOOD PRESSURE: 59 MMHG

## 2024-09-04 VITALS — RESPIRATION RATE: 14 BRPM

## 2024-09-04 VITALS — RESPIRATION RATE: 23 BRPM

## 2024-09-04 VITALS — RESPIRATION RATE: 18 BRPM | SYSTOLIC BLOOD PRESSURE: 107 MMHG | DIASTOLIC BLOOD PRESSURE: 67 MMHG

## 2024-09-04 VITALS — DIASTOLIC BLOOD PRESSURE: 58 MMHG | SYSTOLIC BLOOD PRESSURE: 92 MMHG | RESPIRATION RATE: 14 BRPM

## 2024-09-04 VITALS — RESPIRATION RATE: 12 BRPM | DIASTOLIC BLOOD PRESSURE: 75 MMHG | SYSTOLIC BLOOD PRESSURE: 110 MMHG

## 2024-09-04 VITALS — RESPIRATION RATE: 18 BRPM

## 2024-09-04 VITALS — RESPIRATION RATE: 31 BRPM | DIASTOLIC BLOOD PRESSURE: 73 MMHG | SYSTOLIC BLOOD PRESSURE: 107 MMHG

## 2024-09-04 VITALS — RESPIRATION RATE: 12 BRPM | DIASTOLIC BLOOD PRESSURE: 66 MMHG | SYSTOLIC BLOOD PRESSURE: 101 MMHG

## 2024-09-04 VITALS — BODY MASS INDEX: 31.4 KG/M2 | BODY MASS INDEX: 31.6 KG/M2 | BODY MASS INDEX: 30.4 KG/M2

## 2024-09-04 VITALS — DIASTOLIC BLOOD PRESSURE: 61 MMHG | RESPIRATION RATE: 12 BRPM | SYSTOLIC BLOOD PRESSURE: 99 MMHG

## 2024-09-04 VITALS — SYSTOLIC BLOOD PRESSURE: 112 MMHG | RESPIRATION RATE: 19 BRPM | DIASTOLIC BLOOD PRESSURE: 64 MMHG

## 2024-09-04 VITALS — DIASTOLIC BLOOD PRESSURE: 81 MMHG | SYSTOLIC BLOOD PRESSURE: 113 MMHG

## 2024-09-04 VITALS — RESPIRATION RATE: 20 BRPM | DIASTOLIC BLOOD PRESSURE: 66 MMHG | SYSTOLIC BLOOD PRESSURE: 99 MMHG

## 2024-09-04 VITALS — RESPIRATION RATE: 18 BRPM | SYSTOLIC BLOOD PRESSURE: 114 MMHG | DIASTOLIC BLOOD PRESSURE: 64 MMHG

## 2024-09-04 VITALS — SYSTOLIC BLOOD PRESSURE: 82 MMHG | RESPIRATION RATE: 9 BRPM | DIASTOLIC BLOOD PRESSURE: 52 MMHG

## 2024-09-04 VITALS — DIASTOLIC BLOOD PRESSURE: 50 MMHG | RESPIRATION RATE: 13 BRPM | SYSTOLIC BLOOD PRESSURE: 94 MMHG

## 2024-09-04 VITALS — SYSTOLIC BLOOD PRESSURE: 103 MMHG | RESPIRATION RATE: 13 BRPM | DIASTOLIC BLOOD PRESSURE: 60 MMHG

## 2024-09-04 VITALS — RESPIRATION RATE: 24 BRPM

## 2024-09-04 VITALS — DIASTOLIC BLOOD PRESSURE: 65 MMHG | SYSTOLIC BLOOD PRESSURE: 116 MMHG | RESPIRATION RATE: 16 BRPM

## 2024-09-04 VITALS — RESPIRATION RATE: 21 BRPM

## 2024-09-04 VITALS — RESPIRATION RATE: 10 BRPM

## 2024-09-04 DIAGNOSIS — E27.1: ICD-10-CM

## 2024-09-04 DIAGNOSIS — G40.409: Primary | ICD-10-CM

## 2024-09-04 DIAGNOSIS — E72.12: ICD-10-CM

## 2024-09-04 DIAGNOSIS — I47.10: ICD-10-CM

## 2024-09-04 DIAGNOSIS — H54.7: ICD-10-CM

## 2024-09-04 DIAGNOSIS — I48.0: ICD-10-CM

## 2024-09-04 DIAGNOSIS — R45.1: ICD-10-CM

## 2024-09-04 DIAGNOSIS — F22: ICD-10-CM

## 2024-09-04 DIAGNOSIS — B37.31: ICD-10-CM

## 2024-09-04 LAB
ALBUMIN SERPL-MCNC: 3.8 G/DL (ref 3.5–5)
ALP SERPL-CCNC: 51 U/L (ref 38–126)
ALT SERPL-CCNC: 17 U/L (ref 0–35)
AST SERPL-CCNC: 19 U/L (ref 14–36)
BUN SERPL-MCNC: 12 MG/DL (ref 7–17)
CALCIUM SERPL-MCNC: 9 MG/DL (ref 8.4–10.2)
CHLORIDE SERPL-SCNC: 110 MMOL/L (ref 98–107)
CO2 SERPL-SCNC: 21 MMOL/L (ref 22–30)
EGFR: > 60
ERYTHROCYTE [DISTWIDTH] IN BLOOD BY AUTOMATED COUNT: 14.1 % (ref 11.5–14.5)
ESTIMATED CREATININE CLEARANCE: > 125 ML/MIN
GLUCOSE - POINT OF CARE: 100 MG/DL (ref 70–99)
GLUCOSE - POINT OF CARE: 154 MG/DL (ref 70–99)
GLUCOSE SERPL-MCNC: 112 MG/DL (ref 70–99)
HCT VFR BLD AUTO: 32.5 % (ref 37–47)
HGB BLD-MCNC: 11.3 G/DL (ref 12–16)
MCHC RBC AUTO-ENTMCNC: 34.8 G/DL (ref 33–37)
MCV RBC AUTO: 88.6 FL (ref 81–99)
NRBC BLD AUTO-RTO: 0 %
PLATELET # BLD AUTO: 255 10^3/UL (ref 130–400)
POTASSIUM SERPL-SCNC: 3.7 MMOL/L (ref 3.5–5.1)
PROT SERPL-MCNC: 5.9 G/DL (ref 6.3–8.2)
SODIUM SERPL-SCNC: 141 MMOL/L (ref 135–145)

## 2024-09-04 PROCEDURE — A9575 INJ GADOTERATE MEGLUMI 0.1ML: HCPCS

## 2024-09-04 PROCEDURE — C9254 INJECTION, LACOSAMIDE: HCPCS

## 2024-09-04 RX ADMIN — LORAZEPAM 1 MG: 2 INJECTION INTRAMUSCULAR; INTRAVENOUS at 18:04

## 2024-09-04 RX ADMIN — HYDROCORTISONE SODIUM SUCCINATE 100 MG: 100 INJECTION, POWDER, FOR SOLUTION INTRAMUSCULAR; INTRAVENOUS at 09:38

## 2024-09-04 RX ADMIN — SODIUM CHLORIDE 1000: 900 INJECTION, SOLUTION INTRAVENOUS at 19:49

## 2024-09-04 RX ADMIN — HALOPERIDOL LACTATE 1 MG: 5 INJECTION, SOLUTION INTRAMUSCULAR at 22:44

## 2024-09-04 RX ADMIN — LACOSAMIDE 50 MG: 10 INJECTION INTRAVENOUS at 18:05

## 2024-09-04 RX ADMIN — LORAZEPAM 1 MG: 2 INJECTION INTRAMUSCULAR; INTRAVENOUS at 04:16

## 2024-09-04 RX ADMIN — SODIUM CHLORIDE 1000: 900 INJECTION, SOLUTION INTRAVENOUS at 04:15

## 2024-09-04 RX ADMIN — KETOROLAC TROMETHAMINE 15 MG: 15 INJECTION, SOLUTION INTRAMUSCULAR; INTRAVENOUS at 14:35

## 2024-09-04 RX ADMIN — DIAZEPAM 5 MG: 5 INJECTION INTRAMUSCULAR; INTRAVENOUS at 04:57

## 2024-09-04 RX ADMIN — HYDROCORTISONE SODIUM SUCCINATE 100 MG: 100 INJECTION, POWDER, FOR SOLUTION INTRAMUSCULAR; INTRAVENOUS at 04:15

## 2024-09-04 RX ADMIN — MIDAZOLAM HYDROCHLORIDE 2 MG: 1 INJECTION, SOLUTION INTRAMUSCULAR; INTRAVENOUS at 05:05

## 2024-09-04 RX ADMIN — LEVETIRACETAM 1000 MG: 100 INJECTION, SOLUTION, CONCENTRATE INTRAVENOUS at 05:27

## 2024-09-04 RX ADMIN — QUETIAPINE 25 MG: 25 TABLET, FILM COATED ORAL at 21:49

## 2024-09-04 RX ADMIN — LORAZEPAM 1 MG: 2 INJECTION INTRAMUSCULAR; INTRAVENOUS at 21:06

## 2024-09-04 RX ADMIN — LEVETIRACETAM 1500 MG: 100 INJECTION, SOLUTION, CONCENTRATE INTRAVENOUS at 21:06

## 2024-09-04 RX ADMIN — SODIUM CHLORIDE 0.5 ML: 9 INJECTION, SOLUTION INTRAMUSCULAR; INTRAVENOUS; SUBCUTANEOUS at 21:06

## 2024-09-04 RX ADMIN — HALOPERIDOL LACTATE 1 MG: 5 INJECTION, SOLUTION INTRAMUSCULAR at 05:52

## 2024-09-04 RX ADMIN — LACOSAMIDE: 10 INJECTION INTRAVENOUS at 21:07

## 2024-09-04 RX ADMIN — LORAZEPAM 1 MG: 2 INJECTION INTRAMUSCULAR; INTRAVENOUS at 04:20

## 2024-09-04 RX ADMIN — DIAZEPAM 5 MG: 5 INJECTION INTRAMUSCULAR; INTRAVENOUS at 04:28

## 2024-09-04 RX ADMIN — LEVETIRACETAM 1000 MG: 100 INJECTION, SOLUTION, CONCENTRATE INTRAVENOUS at 04:15

## 2024-09-04 RX ADMIN — DIAZEPAM 10 MG: 5 INJECTION INTRAMUSCULAR; INTRAVENOUS at 21:48

## 2024-09-04 RX ADMIN — LORAZEPAM 1 MG: 2 INJECTION INTRAMUSCULAR; INTRAVENOUS at 13:36

## 2024-09-04 RX ADMIN — LACOSAMIDE 50 MG: 10 INJECTION INTRAVENOUS at 19:49

## 2024-09-04 RX ADMIN — HYDROCORTISONE SODIUM SUCCINATE 50 MG: 100 INJECTION, POWDER, FOR SOLUTION INTRAMUSCULAR; INTRAVENOUS at 18:57

## 2024-09-04 RX ADMIN — LORAZEPAM 2 MG: 2 INJECTION INTRAMUSCULAR; INTRAVENOUS at 16:51

## 2024-09-04 RX ADMIN — ACETAMINOPHEN 1000 MG: 10 INJECTION INTRAVENOUS at 13:53
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
## 2024-09-04 NOTE — PHANOTE
"med rec note- patient unable to answer question at this time. patient family in room but told me to come back. tried to call physician linked to patient pharmacy records but line been disconnected. tried to google physician main but address on "~"google said permanently closed. no ecw records. waiting for patient to be mentally stable from seizure "

## 2024-09-04 NOTE — EEG.RPT
"Electroencephalogram Report"~"Recording"~"Date of EE24"~"Type of EEG: Routine"~"Length of EEG recordin minutes"~"Done with Video Recording: Yes"~"Patient Status: Emergency Room"~"Recording Conditions: Awake and Drowsy"~"Hyperventilation Performed: No"~"Photic Stimulation Performed: Yes"~"Report"~"-: "~"GREATER THAN 1 HOUR EEG REPORT"~"LESS THAN 1 HOUR EEG INTERPRETATION:"~"Moderately abnormal EEG for age in wakefulness through drowsiness due to focal right temporal slowing (T5 maximal) frequently during the study."~"CLINICAL CORRELATION:"~"This study was suggestive of mild breach artifact from the right temporal lobe without clear seizure activity. Consideration for correlation with neuroimaging and clinical findings is advised."~"METHODS:"~"A 21 channel digitized electroencephalogram (EEG) was performed at the bedside. The 10/20 international system of electrode placement was used with ECG and lateral/vertical eye movements recorded.  Video was recorded. Persyst quantitative evaluation "~"system utilized."~"QUALITY OF STUDY:"~"Good"~"ELECTROENCEPHALOGRAPHER IMPRESSION(S):"~"Background"~"Amplitude: Unremarkable"~"Anterior-Posterior Organization: Unremarkable"~"Maximum: Alpha"~"Asymmetry: None"~"Sleep"~"Drowsiness present"~"Photic Stimulation"~"Failed to activate the record"~"ECG"~"Normal sinus rhythm"~"Abnormal EEG Activity"~"	Asymmetry with right temporal (T5) focal slowing of medium amplitude frequently and variability"

## 2024-09-05 VITALS — SYSTOLIC BLOOD PRESSURE: 94 MMHG | DIASTOLIC BLOOD PRESSURE: 64 MMHG | RESPIRATION RATE: 13 BRPM

## 2024-09-05 VITALS — RESPIRATION RATE: 14 BRPM

## 2024-09-05 VITALS — RESPIRATION RATE: 12 BRPM | DIASTOLIC BLOOD PRESSURE: 59 MMHG | SYSTOLIC BLOOD PRESSURE: 100 MMHG

## 2024-09-05 VITALS — RESPIRATION RATE: 11 BRPM

## 2024-09-05 VITALS — RESPIRATION RATE: 13 BRPM | DIASTOLIC BLOOD PRESSURE: 71 MMHG | SYSTOLIC BLOOD PRESSURE: 108 MMHG

## 2024-09-05 VITALS — DIASTOLIC BLOOD PRESSURE: 80 MMHG | RESPIRATION RATE: 16 BRPM | SYSTOLIC BLOOD PRESSURE: 108 MMHG

## 2024-09-05 VITALS — RESPIRATION RATE: 13 BRPM

## 2024-09-05 VITALS — DIASTOLIC BLOOD PRESSURE: 62 MMHG | RESPIRATION RATE: 26 BRPM | SYSTOLIC BLOOD PRESSURE: 98 MMHG

## 2024-09-05 VITALS — SYSTOLIC BLOOD PRESSURE: 117 MMHG | DIASTOLIC BLOOD PRESSURE: 81 MMHG | RESPIRATION RATE: 18 BRPM

## 2024-09-05 VITALS — RESPIRATION RATE: 13 BRPM | SYSTOLIC BLOOD PRESSURE: 99 MMHG | DIASTOLIC BLOOD PRESSURE: 66 MMHG

## 2024-09-05 VITALS — SYSTOLIC BLOOD PRESSURE: 92 MMHG | RESPIRATION RATE: 41 BRPM | DIASTOLIC BLOOD PRESSURE: 58 MMHG

## 2024-09-05 VITALS — SYSTOLIC BLOOD PRESSURE: 108 MMHG | DIASTOLIC BLOOD PRESSURE: 69 MMHG | RESPIRATION RATE: 17 BRPM

## 2024-09-05 VITALS — RESPIRATION RATE: 28 BRPM | SYSTOLIC BLOOD PRESSURE: 100 MMHG | DIASTOLIC BLOOD PRESSURE: 72 MMHG

## 2024-09-05 VITALS — RESPIRATION RATE: 10 BRPM | DIASTOLIC BLOOD PRESSURE: 47 MMHG | SYSTOLIC BLOOD PRESSURE: 89 MMHG

## 2024-09-05 VITALS — RESPIRATION RATE: 18 BRPM | DIASTOLIC BLOOD PRESSURE: 75 MMHG | SYSTOLIC BLOOD PRESSURE: 122 MMHG

## 2024-09-05 VITALS — RESPIRATION RATE: 13 BRPM | SYSTOLIC BLOOD PRESSURE: 93 MMHG | DIASTOLIC BLOOD PRESSURE: 59 MMHG

## 2024-09-05 VITALS — RESPIRATION RATE: 16 BRPM | SYSTOLIC BLOOD PRESSURE: 127 MMHG | DIASTOLIC BLOOD PRESSURE: 81 MMHG

## 2024-09-05 VITALS — RESPIRATION RATE: 25 BRPM | DIASTOLIC BLOOD PRESSURE: 67 MMHG | SYSTOLIC BLOOD PRESSURE: 108 MMHG

## 2024-09-05 VITALS — RESPIRATION RATE: 20 BRPM

## 2024-09-05 VITALS — RESPIRATION RATE: 24 BRPM

## 2024-09-05 VITALS — RESPIRATION RATE: 12 BRPM

## 2024-09-05 VITALS — DIASTOLIC BLOOD PRESSURE: 64 MMHG | SYSTOLIC BLOOD PRESSURE: 99 MMHG | RESPIRATION RATE: 13 BRPM

## 2024-09-05 VITALS — DIASTOLIC BLOOD PRESSURE: 97 MMHG | SYSTOLIC BLOOD PRESSURE: 129 MMHG | RESPIRATION RATE: 13 BRPM

## 2024-09-05 VITALS — SYSTOLIC BLOOD PRESSURE: 121 MMHG | DIASTOLIC BLOOD PRESSURE: 69 MMHG | RESPIRATION RATE: 13 BRPM

## 2024-09-05 VITALS — RESPIRATION RATE: 17 BRPM | DIASTOLIC BLOOD PRESSURE: 84 MMHG | SYSTOLIC BLOOD PRESSURE: 119 MMHG

## 2024-09-05 VITALS — RESPIRATION RATE: 19 BRPM

## 2024-09-05 VITALS — SYSTOLIC BLOOD PRESSURE: 116 MMHG | DIASTOLIC BLOOD PRESSURE: 65 MMHG | RESPIRATION RATE: 12 BRPM

## 2024-09-05 VITALS — RESPIRATION RATE: 33 BRPM | SYSTOLIC BLOOD PRESSURE: 119 MMHG | DIASTOLIC BLOOD PRESSURE: 79 MMHG

## 2024-09-05 VITALS — SYSTOLIC BLOOD PRESSURE: 105 MMHG | DIASTOLIC BLOOD PRESSURE: 75 MMHG | RESPIRATION RATE: 14 BRPM

## 2024-09-05 VITALS — RESPIRATION RATE: 17 BRPM

## 2024-09-05 VITALS — RESPIRATION RATE: 16 BRPM

## 2024-09-05 VITALS — RESPIRATION RATE: 40 BRPM

## 2024-09-05 VITALS — RESPIRATION RATE: 21 BRPM | DIASTOLIC BLOOD PRESSURE: 78 MMHG | SYSTOLIC BLOOD PRESSURE: 110 MMHG

## 2024-09-05 VITALS — RESPIRATION RATE: 12 BRPM | SYSTOLIC BLOOD PRESSURE: 91 MMHG | DIASTOLIC BLOOD PRESSURE: 54 MMHG

## 2024-09-05 VITALS — RESPIRATION RATE: 27 BRPM

## 2024-09-05 VITALS — RESPIRATION RATE: 8 BRPM

## 2024-09-05 VITALS — RESPIRATION RATE: 15 BRPM

## 2024-09-05 VITALS — RESPIRATION RATE: 18 BRPM

## 2024-09-05 VITALS — RESPIRATION RATE: 28 BRPM

## 2024-09-05 LAB
ALBUMIN SERPL-MCNC: 3.3 G/DL (ref 3.5–5)
ALP SERPL-CCNC: 47 U/L (ref 38–126)
ALT SERPL-CCNC: 13 U/L (ref 0–35)
APTT PPP: 24.3 SEC (ref 23.4–35)
AST SERPL-CCNC: 15 U/L (ref 14–36)
BUN SERPL-MCNC: 8 MG/DL (ref 7–17)
CALCIUM SERPL-MCNC: 8.8 MG/DL (ref 8.4–10.2)
CHLORIDE SERPL-SCNC: 113 MMOL/L (ref 98–107)
CO2 SERPL-SCNC: 18 MMOL/L (ref 22–30)
EGFR: > 60
ERYTHROCYTE [DISTWIDTH] IN BLOOD BY AUTOMATED COUNT: 14.5 % (ref 11.5–14.5)
ESTIMATED CREATININE CLEARANCE: > 125 ML/MIN
GLUCOSE - POINT OF CARE: 124 MG/DL (ref 70–99)
GLUCOSE - POINT OF CARE: 127 MG/DL (ref 70–99)
GLUCOSE - POINT OF CARE: 135 MG/DL (ref 70–99)
GLUCOSE SERPL-MCNC: 114 MG/DL (ref 70–99)
HBA1C MFR BLD: 4.7 % (ref 4–5.6)
HCT VFR BLD AUTO: 30.3 % (ref 37–47)
HGB BLD-MCNC: 10.3 G/DL (ref 12–16)
INR PPP: 1.14
MAGNESIUM SERPL-MCNC: 1.9 MG/DL (ref 1.6–2.3)
MCHC RBC AUTO-ENTMCNC: 34 G/DL (ref 33–37)
MCV RBC AUTO: 87.6 FL (ref 81–99)
NRBC BLD AUTO-RTO: 0 %
PLATELET # BLD AUTO: 222 10^3/UL (ref 130–400)
POTASSIUM SERPL-SCNC: 4.1 MMOL/L (ref 3.5–5.1)
PROT SERPL-MCNC: 5.5 G/DL (ref 6.3–8.2)
PROTHROMBIN TIME: 14.6 SEC (ref 11.4–14.6)
SODIUM SERPL-SCNC: 143 MMOL/L (ref 135–145)
URINE RED BLOOD CELL: (no result) /HPF (ref 0–2)
URINE WHITE CELL: (no result) /HPF (ref 0–5)

## 2024-09-05 RX ADMIN — QUETIAPINE 25 MG: 25 TABLET, FILM COATED ORAL at 08:11

## 2024-09-05 RX ADMIN — SODIUM CHLORIDE 1000: 900 INJECTION, SOLUTION INTRAVENOUS at 15:54

## 2024-09-05 RX ADMIN — VALACYCLOVIR HYDROCHLORIDE 1000 MG: 500 TABLET, FILM COATED ORAL at 08:13

## 2024-09-05 RX ADMIN — SODIUM CHLORIDE 0.5 ML: 9 INJECTION, SOLUTION INTRAMUSCULAR; INTRAVENOUS; SUBCUTANEOUS at 02:00

## 2024-09-05 RX ADMIN — LORAZEPAM 1 MG: 2 INJECTION INTRAMUSCULAR; INTRAVENOUS at 18:00

## 2024-09-05 RX ADMIN — MICONAZOLE NITRATE 200 MG: 200 SUPPOSITORY VAGINAL at 15:54

## 2024-09-05 RX ADMIN — LORAZEPAM 1 MG: 2 INJECTION INTRAMUSCULAR; INTRAVENOUS at 10:53

## 2024-09-05 RX ADMIN — LEVETIRACETAM 1500 MG: 100 INJECTION, SOLUTION, CONCENTRATE INTRAVENOUS at 08:12

## 2024-09-05 RX ADMIN — SODIUM CHLORIDE: 9 INJECTION, SOLUTION INTRAMUSCULAR; INTRAVENOUS; SUBCUTANEOUS at 16:51

## 2024-09-05 RX ADMIN — LORAZEPAM 1 MG: 2 INJECTION INTRAMUSCULAR; INTRAVENOUS at 02:01

## 2024-09-05 RX ADMIN — SODIUM CHLORIDE: 9 INJECTION, SOLUTION INTRAMUSCULAR; INTRAVENOUS; SUBCUTANEOUS at 20:29

## 2024-09-05 RX ADMIN — SODIUM CHLORIDE 0.5 ML: 9 INJECTION, SOLUTION INTRAMUSCULAR; INTRAVENOUS; SUBCUTANEOUS at 05:11

## 2024-09-05 RX ADMIN — MONTELUKAST 10 MG: 10 TABLET, FILM COATED ORAL at 17:51

## 2024-09-05 RX ADMIN — ENOXAPARIN SODIUM 40 MG: 40 INJECTION SUBCUTANEOUS at 17:51

## 2024-09-05 RX ADMIN — SODIUM CHLORIDE 1000: 900 INJECTION, SOLUTION INTRAVENOUS at 05:26

## 2024-09-05 RX ADMIN — SODIUM CHLORIDE 1000: 900 INJECTION, SOLUTION INTRAVENOUS at 20:29

## 2024-09-05 RX ADMIN — SODIUM CHLORIDE 0.5 ML: 9 INJECTION, SOLUTION INTRAMUSCULAR; INTRAVENOUS; SUBCUTANEOUS at 18:01

## 2024-09-05 RX ADMIN — LACOSAMIDE 50 MG: 10 INJECTION INTRAVENOUS at 17:52

## 2024-09-05 RX ADMIN — DIVALPROEX SODIUM 500 MG: 500 TABLET, DELAYED RELEASE ORAL at 20:29

## 2024-09-05 RX ADMIN — SODIUM CHLORIDE: 9 INJECTION, SOLUTION INTRAMUSCULAR; INTRAVENOUS; SUBCUTANEOUS at 14:05

## 2024-09-05 RX ADMIN — LEVETIRACETAM 1500 MG: 100 INJECTION, SOLUTION, CONCENTRATE INTRAVENOUS at 20:28

## 2024-09-05 RX ADMIN — SODIUM CHLORIDE 0.5 ML: 9 INJECTION, SOLUTION INTRAMUSCULAR; INTRAVENOUS; SUBCUTANEOUS at 14:28

## 2024-09-05 RX ADMIN — HALOPERIDOL LACTATE 1 MG: 5 INJECTION, SOLUTION INTRAMUSCULAR at 22:30

## 2024-09-05 RX ADMIN — HYDROCORTISONE SODIUM SUCCINATE 50 MG: 100 INJECTION, POWDER, FOR SOLUTION INTRAMUSCULAR; INTRAVENOUS at 01:05

## 2024-09-05 RX ADMIN — LACOSAMIDE 50 MG: 10 INJECTION INTRAVENOUS at 05:11

## 2024-09-05 RX ADMIN — Medication 1 CAPLET: at 08:11

## 2024-09-05 RX ADMIN — LORAZEPAM 1 MG: 2 INJECTION INTRAMUSCULAR; INTRAVENOUS at 05:11

## 2024-09-05 RX ADMIN — CHOLECALCIFEROL TAB 125 MCG (5000 UNIT) 125 MCG: 125 TAB at 08:11

## 2024-09-05 RX ADMIN — HYDROCORTISONE 10 MG: 10 TABLET ORAL at 17:51

## 2024-09-05 RX ADMIN — HYDROCORTISONE SODIUM SUCCINATE 50 MG: 100 INJECTION, POWDER, FOR SOLUTION INTRAMUSCULAR; INTRAVENOUS at 05:11

## 2024-09-05 RX ADMIN — LORAZEPAM 1 MG: 2 INJECTION INTRAMUSCULAR; INTRAVENOUS at 22:20

## 2024-09-05 RX ADMIN — SODIUM CHLORIDE 0.5 ML: 9 INJECTION, SOLUTION INTRAMUSCULAR; INTRAVENOUS; SUBCUTANEOUS at 10:53

## 2024-09-05 RX ADMIN — LORAZEPAM 1 MG: 2 INJECTION INTRAMUSCULAR; INTRAVENOUS at 14:28

## 2024-09-05 RX ADMIN — FERROUS SULFATE TAB 325 MG (65 MG ELEMENTAL FE) 325 MG: 325 (65 FE) TAB at 08:11

## 2024-09-05 NOTE — PTCARENOTE
Pt reassessed. Warm blankets provided. Pt turning and repositioning self in bed. Oriented to whereabouts. Calm and cooperative, wants to sleep. Will monitor.

## 2024-09-05 NOTE — PTCARENOTE
Patient in better spirits. Sitting up in bed and talking with RN. Vitals stable. Assisted in ordering dinner.

## 2024-09-05 NOTE — PTCARENOTE
"Around 1130, patient anxious and restless in bed. Stating 'I need to leave.' Emotional support and education provided. Patient calmer and agreeable to stay. Stating she wants no more sedatives. Spoke with Intensivist. [place]uled [place] and Seroquel "~"discontinued. "

## 2024-09-05 NOTE — PTCARENOTE
"Patient started getting agitated once boyfriend left the room to go home, tearful and attempting to get OOB. staff at bedside, pt calling boyfriend to come back up and get her because she is leaving, pt states, 'I don't want to be here anymore, I "~"want the papers to sign so I can leave.' Pt was AAOx3 but at that time was very agitated and unable to answer questions, pt started having seizure like activity, pt stiff, unable to follow commands, staring towards her right, eyes twitching, PRN "~"meds given per MD order, pt postictal and very agitated, pt states, 'give me something now but I do not want Seroquel.' boyfriend at the bedside, NP made aware and at bedside, pt once calmed down AAOx3 and still wanting to leave and go home with "~"boyfriend. pt now appears to be sleeping comfortably in bed, boyfriend states, ' If she wakes up you will have to sedate her. I cannot take her home. Sedate her until she has a psych eval.' NP spoke to daraiend again before leaving the unit. "

## 2024-09-05 NOTE — W.PN.UPDATE
"Update Note"~"Progress Note Update"~"-: "~"chart was reviewed. spoke with dr zuniga.  the patient asked me to come back tomorrow. she said she was 'post ictal ' and could not talk now.  will return in the morning.  "

## 2024-09-05 NOTE — PTCARENOTE
"RN at bedside to administer 1800 medications. Patient in chair c/o aura; undescribed. Patient with tonic movement from neck to b/l le, including arms. No clonic movement noted. Eyes open with observed nystagmus. HR up to 120-140's. Episode roughly 3 "~"minutes. 1800 dose of Vimpat and 1mg Ativan administered. Post ictal consistent to agitation and tearfulness. Attempting to jump out of chair. Arguing with boyfriend. Asking why he is mad at her and she 'can't control when it happens.' Patient "~"assisted safely back to bed by multiple RNs. Safe environment maintained. "

## 2024-09-05 NOTE — PTCARENOTE
"Patient more alert. States she does not like the way the Seroquel is making her feel. Assisted in repositioning in bed. Sitting in chair position. Lunch set up. Appetite good. ST in the 110-120's while up and moving. Of note, patient agitated and "~"arguing on phone with someone. Tearful. "

## 2024-09-05 NOTE — CM
"CM following re: discharge planning. "~"Reviewed pt's chart, met with pt and pt's boyfriend at bedside. "~"Pt is a 29 year old female, admitted with primary dx of Seizure. "~"Pt reports she lives with 8 and 10 year old children in [facility],  2SH, 2 steps to enter. Pt reports she is here to visit her boyfriend. boyfriend stated he is MD. Pt stated her sisters are taking care of her children while she is visiting her "~"boyfriend. Pt stated she will return back top her boyfriend's house for a few days and she will return back home. "~"D/C plan: home with anticipated no needs. Boyfriend to transport. "~"CM will follow with discharge plan updates as hospitalization progresses"

## 2024-09-05 NOTE — PTCARENOTE
"Around 1430, patient's boyfriend called RN to bedside. Stating patient is experiencing an 'aura'. Upon entering the room, patient laying with head tilted up. Eyes fluttering. Shallow breathing. Not responding to RN. 1mg Ativan administered as "~"ordered PRN. Vitals stable. Intensivist and Neurologist at bedside to see patient. Patient downgraded to IMU level."

## 2024-09-05 NOTE — W.PN.UPDATE
"Update Note"~"Progress Note Update"~"-: "~"Called to see patient about seizure.  Nursing noted the patient had her head tilted up, eyes were fluttering and she was not responding.  There were no other findings per nursing.  She was given 1 mg of Ativan.  Upon my arrival, patient was "~"lethargic but following commands, opening eyes, sticking tongue out.  Parthaienpayam was at bedside during event.  Seems like seizure activity was different than that observed in the ED."~"While I was discussing with the boyfriend, patient remained lethargic.  However when discussed history of intubation, patient suddenly began to answer questions and speak specifically regarding intubations, hospitalizations."~"Patient apparently sees neurology at [facility], but is hospitalized and sees endocrinology at [facility].  When asked why she sees physicians in different places, geri states that she has been turned away from certain hospitals due to "~"diagnosis of pseudoseizure and malingering."~"To clarify some of the medical records"~"Geri states patient is intubated 5 times this year.  Twice for flu/RSV leading to severe asthma exacerbation.  Twice for seizure disorder.  Geri states that one of them was premature intubation.  Patient does not see a pulmonologist."~"When asked why Keppra dosing was decreased to half dose 2 weeks ago apparently by neurologist, she is not seen in 6 months, geri states that 'that is their standard of care'"~"Boyrbendapayam is an ED physician and did relate to me that patient has been turned away from facilities due to malingering, diagnosis of pseudoseizure"~"Continue with Ativan as needed"~"Head of bed elevated"~"Continue management per neurology"~"Await psychiatry evaluation"

## 2024-09-05 NOTE — PTCARENOTE
"on assessment pt drowsy but alert to voice, AAOx3, denies pain, c/o L eye aura, neurology and NP made aware, no new orders at this time, MRI was completed today, SR on the monitor, + pulses, +1 edema to L hand, RA, 100%, +BS, regular diet, hitchcock for "~"retention, R foot boot, boyfriend at bedside, call bell in reach."

## 2024-09-06 VITALS — DIASTOLIC BLOOD PRESSURE: 62 MMHG | RESPIRATION RATE: 17 BRPM | SYSTOLIC BLOOD PRESSURE: 97 MMHG

## 2024-09-06 VITALS — DIASTOLIC BLOOD PRESSURE: 78 MMHG | SYSTOLIC BLOOD PRESSURE: 99 MMHG | RESPIRATION RATE: 29 BRPM

## 2024-09-06 VITALS — HEART RATE: 72 BPM | SYSTOLIC BLOOD PRESSURE: 119 MMHG | OXYGEN SATURATION: 97 % | DIASTOLIC BLOOD PRESSURE: 72 MMHG

## 2024-09-06 VITALS — SYSTOLIC BLOOD PRESSURE: 127 MMHG | RESPIRATION RATE: 20 BRPM | DIASTOLIC BLOOD PRESSURE: 77 MMHG

## 2024-09-06 VITALS — DIASTOLIC BLOOD PRESSURE: 67 MMHG | SYSTOLIC BLOOD PRESSURE: 97 MMHG | RESPIRATION RATE: 13 BRPM

## 2024-09-06 VITALS — RESPIRATION RATE: 13 BRPM

## 2024-09-06 VITALS — RESPIRATION RATE: 15 BRPM

## 2024-09-06 VITALS — SYSTOLIC BLOOD PRESSURE: 96 MMHG | DIASTOLIC BLOOD PRESSURE: 65 MMHG | RESPIRATION RATE: 12 BRPM

## 2024-09-06 VITALS — RESPIRATION RATE: 8 BRPM

## 2024-09-06 VITALS — RESPIRATION RATE: 16 BRPM | DIASTOLIC BLOOD PRESSURE: 81 MMHG | SYSTOLIC BLOOD PRESSURE: 126 MMHG

## 2024-09-06 VITALS — SYSTOLIC BLOOD PRESSURE: 108 MMHG | DIASTOLIC BLOOD PRESSURE: 74 MMHG

## 2024-09-06 VITALS — RESPIRATION RATE: 14 BRPM | SYSTOLIC BLOOD PRESSURE: 110 MMHG | DIASTOLIC BLOOD PRESSURE: 79 MMHG

## 2024-09-06 VITALS — RESPIRATION RATE: 12 BRPM

## 2024-09-06 VITALS — RESPIRATION RATE: 19 BRPM | DIASTOLIC BLOOD PRESSURE: 79 MMHG | SYSTOLIC BLOOD PRESSURE: 119 MMHG

## 2024-09-06 VITALS — RESPIRATION RATE: 11 BRPM

## 2024-09-06 VITALS — DIASTOLIC BLOOD PRESSURE: 72 MMHG | SYSTOLIC BLOOD PRESSURE: 104 MMHG | RESPIRATION RATE: 11 BRPM

## 2024-09-06 VITALS — RESPIRATION RATE: 21 BRPM | DIASTOLIC BLOOD PRESSURE: 62 MMHG | SYSTOLIC BLOOD PRESSURE: 91 MMHG

## 2024-09-06 VITALS — SYSTOLIC BLOOD PRESSURE: 88 MMHG | DIASTOLIC BLOOD PRESSURE: 57 MMHG | RESPIRATION RATE: 23 BRPM

## 2024-09-06 VITALS — RESPIRATION RATE: 17 BRPM

## 2024-09-06 VITALS — RESPIRATION RATE: 24 BRPM

## 2024-09-06 VITALS — RESPIRATION RATE: 16 BRPM

## 2024-09-06 VITALS — DIASTOLIC BLOOD PRESSURE: 87 MMHG | SYSTOLIC BLOOD PRESSURE: 127 MMHG | RESPIRATION RATE: 15 BRPM

## 2024-09-06 VITALS — RESPIRATION RATE: 17 BRPM | SYSTOLIC BLOOD PRESSURE: 130 MMHG | DIASTOLIC BLOOD PRESSURE: 74 MMHG

## 2024-09-06 LAB
ALBUMIN SERPL-MCNC: 3.2 G/DL (ref 3.5–5)
ALP SERPL-CCNC: 53 U/L (ref 38–126)
ALT SERPL-CCNC: 12 U/L (ref 0–35)
AST SERPL-CCNC: 12 U/L (ref 14–36)
BUN SERPL-MCNC: 9 MG/DL (ref 7–17)
CALCIUM SERPL-MCNC: 8.8 MG/DL (ref 8.4–10.2)
CHLORIDE SERPL-SCNC: 113 MMOL/L (ref 98–107)
CO2 SERPL-SCNC: 20 MMOL/L (ref 22–30)
EGFR: > 60
ERYTHROCYTE [DISTWIDTH] IN BLOOD BY AUTOMATED COUNT: 14.5 % (ref 11.5–14.5)
ESTIMATED CREATININE CLEARANCE: > 125 ML/MIN
GLUCOSE - POINT OF CARE: 100 MG/DL (ref 70–99)
GLUCOSE SERPL-MCNC: 92 MG/DL (ref 70–99)
HCT VFR BLD AUTO: 31 % (ref 37–47)
HGB BLD-MCNC: 10.4 G/DL (ref 12–16)
MCHC RBC AUTO-ENTMCNC: 33.5 G/DL (ref 33–37)
MCV RBC AUTO: 89.3 FL (ref 81–99)
NRBC BLD AUTO-RTO: 0 %
PLATELET # BLD AUTO: 205 10^3/UL (ref 130–400)
POTASSIUM SERPL-SCNC: 3.7 MMOL/L (ref 3.5–5.1)
PROT SERPL-MCNC: 5.3 G/DL (ref 6.3–8.2)
SODIUM SERPL-SCNC: 143 MMOL/L (ref 135–145)

## 2024-09-06 RX ADMIN — DIVALPROEX SODIUM 500 MG: 500 TABLET, DELAYED RELEASE ORAL at 08:24

## 2024-09-06 RX ADMIN — SODIUM CHLORIDE: 9 INJECTION, SOLUTION INTRAMUSCULAR; INTRAVENOUS; SUBCUTANEOUS at 02:55

## 2024-09-06 RX ADMIN — LEVETIRACETAM 1500 MG: 100 INJECTION, SOLUTION, CONCENTRATE INTRAVENOUS at 08:25

## 2024-09-06 RX ADMIN — LACOSAMIDE 50 MG: 10 INJECTION INTRAVENOUS at 05:35

## 2024-09-06 RX ADMIN — SODIUM CHLORIDE: 9 INJECTION, SOLUTION INTRAMUSCULAR; INTRAVENOUS; SUBCUTANEOUS at 05:18

## 2024-09-06 RX ADMIN — HYDROCORTISONE 20 MG: 10 TABLET ORAL at 05:42

## 2024-09-06 RX ADMIN — VALACYCLOVIR HYDROCHLORIDE 1000 MG: 500 TABLET, FILM COATED ORAL at 08:24

## 2024-09-06 RX ADMIN — SODIUM CHLORIDE: 9 INJECTION, SOLUTION INTRAMUSCULAR; INTRAVENOUS; SUBCUTANEOUS at 10:58

## 2024-09-06 RX ADMIN — Medication 1 CAPLET: at 08:24

## 2024-09-06 RX ADMIN — FERROUS SULFATE TAB 325 MG (65 MG ELEMENTAL FE) 325 MG: 325 (65 FE) TAB at 08:24

## 2024-09-06 RX ADMIN — SODIUM CHLORIDE: 9 INJECTION, SOLUTION INTRAMUSCULAR; INTRAVENOUS; SUBCUTANEOUS at 02:59

## 2024-09-06 RX ADMIN — CHOLECALCIFEROL TAB 125 MCG (5000 UNIT) 125 MCG: 125 TAB at 08:24

## 2024-09-06 NOTE — PTCARENOTE
"Pt awake resting in bed. Boyfried at the bedside. AM care given. Complete CHG bath. Brennan care. Pt did own oral care. Brennan then dc'd as pt normally st caths herself. Will try with catheter out. IV fluids capped. Dr. Moy in to see pt from "~"Neurology. Pts boyfriend at bedside and expressing concern over continues loss of vision in the R upper quadrant of her L eye. Pt continues to describe it as a 'pie type slice'. Eyes examined by Dr. Moy but will also let Dr. Watkins know. Pt then "~"assisted oob -pivoted to the chair- not putting weight on ther R foot. During am care R foot boot removed to examine foot-foot with light bruising on the anterior aspect of the foot. Very minimal swelling. Good CMS checks. Boot reapplied. Currently "~"oob in the chair. Call bell in reach. "
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
## 2024-09-06 NOTE — CM
"CM following re: discharge planning. "~"Reviewed pt's chart, met with pt,. "~"Discharge order noted. pt is aware. "~"No after care VN needs identified. Pt stated she will stay with her boyfriend for a few days and she will return back to her house. Pt is aware to show discharge instruction to the Court regarding her jury duty that was scheduled for today. "~"Discharge instructions show admission date and discharge date."~"D/C plan: home no needs. Boyfriend to transport.  "

## 2024-09-06 NOTE — W.DCSUMMARY
"Discharge Summary"~"Discharge Data"~"Date of Admission: 09/04/24"~"Date of Discharge: 09/06/24"~"-"~"Pending Results: No"~"Discharge Plan"~"-"~"Patient Disposition: Home (Routine Discharge)"~"Discharge Diagnosis/Procedures: Seizure "~"Persistent vision loss RUQ left eye since admission 09/04 "~"Ramesh's disease "~"Vaginal Yeast infection "~"Condition: Fair"~"Diet: Regular"~"Activity: As tolerated"~"Driving Restrictions: No driving"~"Bathing Restrictions: None"~"Blood Work: Please repeat CBC and CMP with primary care provider in 1 week of discharge "~"Activity Restrictions/Additional Instructions:"~"Please follow up with your primary care provider, neurology, and endocrinologist in 1-2 weeks of discharge. "~"Ophthalmology appointment has been scheduled for you today to evaluate left eye right upper quadrant vision loss.  Please keep appointment"~"As per neurology, Depakote and Vimpat have been prescribed for seizure disorder.  Home Keppra medication has also been adjusted."~"Monostat 3 prescribed for yeast infection, two more doses recommended then stop. "~"Please take medications as prescribed/recommended and follow up with primary care provider and/or other healthcare provider involved in your care for refills and/or further adjustment to your medication regimen as necessary.  "~"Referrals:"~"UNKNOWN - PT DOES,NOT KNOW [Family Provider] - "~"Radha Godinez MD [Active] - 09/06/24 2:00 pm (Appointment is with Dr Isabel Katz)"~"Prescriptions:"~"New"~"  levetiracetam 500 mg Tablet "~"   1,000 mg PO BID 30 Days Qty: 120 0RF"~"  divalproex 500 mg Tablet,Delayed Release (/Ec) "~"   500 mg PO BID 30 Days Qty: 60 0RF"~"  Miconazole-3 200 mg Suppository "~"   200 mg vaginal HS Qty: 3 0RF"~"   Rx Instructions:"~"   can complete after 2nd dose"~"  lacosamide 50 mg Tablet "~"   50 mg PO BID 30 Days Qty: 60 0RF"~"Continued"~"  clonazepam 0.5 mg Tablet "~"   0.5 mg PO BIDPRN PRN (Reason: seizures) "~"  hydrocortisone 20 mg Tablet "~"   50 mg PO DAILY@0400 "~"  hydrocortisone 10 mg Tablet "~"   17.5 mg PO 0600,0800,1100,1600 "~"   Patient Comments:"~"   Pt uses 5mg and 10mg tabs."~"  valacyclovir [Valtrex] 1 gram Tablet "~"   1,000 mg PO DAILY "~"  albuterol sulfate 90 mcg/actuation HFA aerosol inhaler "~"   2 puff INHALATION Q4HPRN PRN (Reason: shortness of breath) "~"  ferrous sulfate [iron] 325 mg (65 mg iron) Tablet "~"   325 mg PO DAILY "~"  vitamin B complex  Tablet "~"   1 tab PO DAILY "~"  montelukast [Singulair] 10 mg Tablet "~"   10 mg PO QPM "~"  budesonide-formoterol [Symbicort] 160-4.5 mcg/actuation Hfa Aerosol Inhaler "~"   2 puff INHALATION BID "~"  cholecalciferol (vitamin D3) [Vitamin D3] 125 mcg (5,000 unit) Tablet "~"   125 mcg PO DAILY "~"  levomefolate calcium [L-Methylfolate] 15 mg Tablet "~"   15 mg PO DAILY "~"Discontinued"~"  levetiracetam [Keppra] 1,000 mg Tablet "~"   750 mg PO BID "~"Discharge Orders:"~"Discharge Patient  (As Directed); Ordered 09/06/24"~"   Ordered By:  Keily Watkins"~"Discharge Date and Time"~"Print Language: ENGLISH"

## 2024-09-06 NOTE — W.PN.INTV
"Addendum entered and electronically signed by Desiree Arteaga MD  09/06/24 12:13: "~"Patient seen and examined independently from below "~"Patient feeling well this morning.  Denies shortness of breath, chest pain, nausea, abdominal pain.  She ambulates to the commode/bathroom without difficulty.  She is anxious to go home"~"Events overnight noted.  Apparently had another seizure which when discussed with neurology who evaluated her immediately after the seizure, pseudoseizure was suspected per discussion with neurology"~"Chest exam is clear, neuroexam nonfocal"~"Data reviewed"~"A/P"~"Moving forward, continue with supportive care"~"Remains on antiseizure medication, will defer to neurology"~"Neurology suspect pseudoseizure.  Patient states she is 'postictal' but then carries on a conversation without difficulty"~"Also refused psychiatry evaluation yesterday because she was 'postictal' although she was answering my questions just prior to this without any issues"~"Defer any further management per primary service and neurology."~"Encourage ambulation, DVT prophylaxis.  Patient refusing sequential teds.  Right boot in place"~"Continue inhaler therapy"~"Patient has been downgraded.  We will sign off.  Please call with questions"~"Original Note:"~"Today's Communication / Plan"~"Recommendations"~"-: "~"Patient is hemodynamically stable with no apparent seizure activity.  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 The ventricles and sulci are normal in size and configuration. There is no mass effect, midline shift, or extra axial collection. There is no abnormal "~"parenchymal or meningeal enhancement. There is no abnormal signal intensity on diffusion-weighted images. The vascular structures at the skull base are unremarkable, as far as visualized. Posterior left maxillary sinus mucus retention cyst."~"Head CT conducted on 9/4/2024: No acute intracranial abnormality. Unenhanced CT imaging of the head reveals no findings to suggest recent infarction, intracranial hemorrhage, extra-axial fluid collection, mass effect or midline shift. The "~"ventricles, cisterns and sulci are within the limits of normal. The brainstem and posterior fossa structures demonstrate no significant focal abnormality."~"Chest x-ray conducted on 9/5/2024: No acute cardiopulmonary abnormality."~"Subjective Dataa"~"Subjective Data"~"Date of Service: "~"Date of Service:  September 6, 2024"~"Met with patient at the bedside.  She is calm and cooperative in discussion and was apologetic for any possible anger she had towards the writer.  She does not remember meeting with the writer yesterday or speaking with me about her neurological "~"exam.  She expresses a desire to be discharged."~"Review of Systems"~"General: Other (Agitation)"~"Objective Data"~"Data Reviewed"~"Vital Signs / I&O / Oxygen: "~"Vital Signs"~"Temp	Pulse	Resp	BP	Pulse Ox"~" 98.6 F 	 85 	 17 	 130/74 	 99 "~" 09/06/24 08:00	 09/06/24 09:00	 09/06/24 09:00	 09/06/24 09:00	 09/06/24 09:00"~"Intake and Output"~"	09/05/24	09/06/24	09/07/24"~"	06:59	06:59	06:59"~"Intake Total	1100 / 1200	3130 / 3230	750 / 750"~"Output Total	1120 / 1230	2895 / 2895	900 / 900"~"Balance	-20 / -30	235 / 335	-150 / -150"~"SaO2                          	99                                              	"~"Nasal Cannula flow liters per 	2                                               	"~"minute                        	"~"Labs/Micro/Reports"~"-: "~"Lab Data"~"09/06/24 06:08 "~"09/06/24 06:08 "

## 2024-09-06 NOTE — PTCARENOTE
"Pt with 1400 opthamology appointment today. Discharge order written and DC instructions given/reviewed with pt. Verbalized understanding. Pts s/o in room at well. Rolling walker sent with pt after script obtained. Script sent with pt for St Cath "~"kits. Pt had not voided prior to discharge and post hitchcock removal but will st cath herself at home. All belongings from room returned to pt. Capped int removed from her R hand. Site wnl. Pt refused to be taken out via wheelchair and ambulated using "~"the walker out to the car with nursing staff. "

## 2024-09-06 NOTE — PTCARENOTE
pt appears to be resting comfortably in bed, no changes from prior assessment, call bell in reach, bed alarm on.

## 2024-09-06 NOTE — PTCARENOTE
"Rec'd pt at 0800 sleeping. Awakens easily to verbal stimuli and is alert and oriented although admits to feeling tired. States overall she feels better. Did have a brief episode of crying when talking on the phone but that subsided. States she is "~"coming up on the anniversary of her miscarriage 3 yrs ago at 22 wks. No seizure activity noted. LOW- pt does admit to continued wedge shaped loss of vision in the R upper quadrant of her L eye. States it hasn't improved since admission. Vision "~"otherwise wnl. LEAL. Is able to move her R leg but does have R foot in a boot from injuring it when she dropped a cinder block on it. R foot with good cms checks. Tender to the touch but is warm with nailbed refill &lt;2 secconds. R anterior surface "~"with sl swelling and ecchymosis. Skin otherwise is wm and dry. Respirs are unlabored on RA with sats of 99%. BS are clear. Monitor SR-ST with activity. + pulses. ABd is soft with + BS. Denies nausea. Brennan intact for yellow urine. In for urinary "~"retention. IV fluids infusing via R hand IV site. NSS at 100 ml/hr. Pt able to help with repositioniing. Good appetite for breakfast. Call bell in reach and plan of care reviewed with pt. Support given. "

## 2024-09-06 NOTE — W.PN.UPDATE
"Update Note"~"Progress Note Update"~"-: "~"-Reported by the nursing staff that patient would like to leave AMA after her boyfriend tried to leave the floor. Patient started to be agitated and requested AMA papers to sign. Boyfriend was called back to the floor. "~"-Patient had one episode of activity like seizure few minutes after boyfriend back to the floor, and received one time of Ativan PRN, postictal patient started to be agitated and tried to leave the bed stating that she want to leave now. Patient "~"received one time of haldol IV that help the patient to calm down. "~"-Once the patient calmed down she requested to leave AMA again. Patient is alert and oriented x 3 now, able to answer the questions correctly and mentioned that she had to leave AMA now so she can see her kids by tomorrow, boyfriend at bedside and "~"refused to pickup the patient. "~"-Boyfriend is asking to sedate the patient. This writer explained to the boyfriend that I can not hold the patient against her wishes if she is alert and oriented x3. "~"-Plan was discussed with the boyfriend, This writer explained that if the patient decided to leave 4hrs after last dose of yun was given,  will not able to hold her against her wishes or sedating her."~"-Per my conversation at this time with the patient is AAOX3, not threatening to hurt herself or others."~"-Later I check on the patient and she was sleeping comfortable in bed. Psych on board and following the case.    "

## 2024-09-06 NOTE — PTCARENOTE
Dr. Watkins in to speak with pt. Plan is to discharge pt and pt will have an opthamology appointment at 1400 today. Pt and boyfriend aware. No other changes. Awaiting dc orders.

## 2024-09-06 NOTE — PTCARENOTE
Sitting back in bed on the side of the bed eating lunch. No complaints. Dr. Watkins in to speak with pt and boyfriend.

## 2024-12-23 NOTE — ASSESSMENT & PLAN NOTE
· Given 100mg IV hydrocortisone in ED   · Will continue stress dose 50mg IV hydrocortixone q6H overnight while pending further review 18

## 2025-05-26 VITALS — SYSTOLIC BLOOD PRESSURE: 114 MMHG | DIASTOLIC BLOOD PRESSURE: 72 MMHG

## 2025-05-26 VITALS — DIASTOLIC BLOOD PRESSURE: 75 MMHG | SYSTOLIC BLOOD PRESSURE: 92 MMHG

## 2025-05-26 VITALS — DIASTOLIC BLOOD PRESSURE: 52 MMHG | SYSTOLIC BLOOD PRESSURE: 95 MMHG

## 2025-05-26 VITALS — SYSTOLIC BLOOD PRESSURE: 94 MMHG | DIASTOLIC BLOOD PRESSURE: 50 MMHG

## 2025-05-26 VITALS — SYSTOLIC BLOOD PRESSURE: 120 MMHG | DIASTOLIC BLOOD PRESSURE: 64 MMHG

## 2025-05-26 VITALS — SYSTOLIC BLOOD PRESSURE: 109 MMHG | DIASTOLIC BLOOD PRESSURE: 61 MMHG

## 2025-05-26 VITALS — SYSTOLIC BLOOD PRESSURE: 100 MMHG | DIASTOLIC BLOOD PRESSURE: 57 MMHG

## 2025-05-26 VITALS — SYSTOLIC BLOOD PRESSURE: 116 MMHG | DIASTOLIC BLOOD PRESSURE: 81 MMHG

## 2025-05-26 VITALS — DIASTOLIC BLOOD PRESSURE: 65 MMHG | SYSTOLIC BLOOD PRESSURE: 102 MMHG

## 2025-05-26 VITALS — BODY MASS INDEX: 30 KG/M2

## 2025-05-26 VITALS — DIASTOLIC BLOOD PRESSURE: 75 MMHG | SYSTOLIC BLOOD PRESSURE: 120 MMHG

## 2025-05-26 VITALS — SYSTOLIC BLOOD PRESSURE: 90 MMHG | DIASTOLIC BLOOD PRESSURE: 49 MMHG

## 2025-05-26 VITALS — DIASTOLIC BLOOD PRESSURE: 67 MMHG | SYSTOLIC BLOOD PRESSURE: 106 MMHG

## 2025-05-26 VITALS — BODY MASS INDEX: 28.1 KG/M2

## 2025-05-26 VITALS — SYSTOLIC BLOOD PRESSURE: 138 MMHG | DIASTOLIC BLOOD PRESSURE: 124 MMHG

## 2025-05-26 VITALS — DIASTOLIC BLOOD PRESSURE: 69 MMHG | SYSTOLIC BLOOD PRESSURE: 102 MMHG

## 2025-05-26 LAB
ALBUMIN SERPL-MCNC: 4.1 G/DL (ref 3.5–5)
ALP SERPL-CCNC: 46 U/L (ref 38–126)
ALT SERPL-CCNC: 12 U/L (ref 0–35)
AST SERPL-CCNC: 13 U/L (ref 14–36)
BUN SERPL-MCNC: 13 MG/DL (ref 7–17)
CALCIUM SERPL-MCNC: 8.7 MG/DL (ref 8.4–10.2)
CHLORIDE SERPL-SCNC: 110 MMOL/L (ref 98–107)
CO2 SERPL-SCNC: 23 MMOL/L (ref 22–30)
CORTIS SERPL-MCNC: 13.3 UG/DL
EGFR: > 60
ERYTHROCYTE [DISTWIDTH] IN BLOOD BY AUTOMATED COUNT: 12.5 % (ref 11.5–14.5)
ESTIMATED CREATININE CLEARANCE: > 125 ML/MIN
GLUCOSE SERPL-MCNC: 90 MG/DL (ref 70–99)
HCT VFR BLD AUTO: 36.7 % (ref 37–47)
HGB BLD-MCNC: 12.5 G/DL (ref 12–16)
MCHC RBC AUTO-ENTMCNC: 34.1 G/DL (ref 33–37)
MCV RBC AUTO: 90.4 FL (ref 81–99)
NRBC BLD AUTO-RTO: 0 %
PLATELET # BLD AUTO: 245 10^3/UL (ref 130–400)
POTASSIUM SERPL-SCNC: 4.6 MMOL/L (ref 3.5–5.1)
PROT SERPL-MCNC: 6.3 G/DL (ref 6.3–8.2)
SODIUM SERPL-SCNC: 138 MMOL/L (ref 135–145)
TROPONIN I SERPL-MCNC: < 0.012 NG/ML

## 2025-05-26 RX ADMIN — ENOXAPARIN SODIUM 40 MG: 40 INJECTION SUBCUTANEOUS at 17:51

## 2025-05-26 RX ADMIN — HYDROCORTISONE 10 MG: 10 TABLET ORAL at 15:52

## 2025-05-26 RX ADMIN — BUDESONIDE AND FORMOTEROL FUMARATE DIHYDRATE 2 PUFF: 160; 4.5 AEROSOL RESPIRATORY (INHALATION) at 18:18

## 2025-05-26 RX ADMIN — DIVALPROEX SODIUM 500 MG: 500 TABLET, DELAYED RELEASE ORAL at 20:27

## 2025-05-26 RX ADMIN — LACOSAMIDE 50 MG: 50 TABLET, FILM COATED ORAL at 20:31

## 2025-05-26 RX ADMIN — HYDROCORTISONE 5 MG: 10 TABLET ORAL at 20:28

## 2025-05-27 VITALS — SYSTOLIC BLOOD PRESSURE: 91 MMHG | DIASTOLIC BLOOD PRESSURE: 46 MMHG

## 2025-05-27 VITALS — DIASTOLIC BLOOD PRESSURE: 55 MMHG | SYSTOLIC BLOOD PRESSURE: 97 MMHG

## 2025-05-27 VITALS — SYSTOLIC BLOOD PRESSURE: 113 MMHG | HEART RATE: 77 BPM | OXYGEN SATURATION: 99 % | DIASTOLIC BLOOD PRESSURE: 68 MMHG

## 2025-05-27 VITALS — DIASTOLIC BLOOD PRESSURE: 72 MMHG | SYSTOLIC BLOOD PRESSURE: 115 MMHG | HEART RATE: 77 BPM | OXYGEN SATURATION: 99 %

## 2025-05-27 VITALS — SYSTOLIC BLOOD PRESSURE: 106 MMHG | DIASTOLIC BLOOD PRESSURE: 65 MMHG

## 2025-05-27 VITALS — SYSTOLIC BLOOD PRESSURE: 122 MMHG | DIASTOLIC BLOOD PRESSURE: 71 MMHG

## 2025-05-27 VITALS — DIASTOLIC BLOOD PRESSURE: 57 MMHG | SYSTOLIC BLOOD PRESSURE: 123 MMHG

## 2025-05-27 VITALS — SYSTOLIC BLOOD PRESSURE: 98 MMHG | DIASTOLIC BLOOD PRESSURE: 59 MMHG

## 2025-05-27 VITALS — DIASTOLIC BLOOD PRESSURE: 65 MMHG | SYSTOLIC BLOOD PRESSURE: 97 MMHG

## 2025-05-27 LAB
ALBUMIN SERPL-MCNC: 4.7 G/DL (ref 3.5–5)
ALP SERPL-CCNC: 46 U/L (ref 38–126)
ALT SERPL-CCNC: 14 U/L (ref 0–35)
AST SERPL-CCNC: 16 U/L (ref 14–36)
BUN SERPL-MCNC: 11 MG/DL (ref 7–17)
BUN SERPL-MCNC: 12 MG/DL (ref 7–17)
CALCIUM SERPL-MCNC: 9.3 MG/DL (ref 8.4–10.2)
CHLORIDE SERPL-SCNC: 109 MMOL/L (ref 98–107)
CHLORIDE SERPL-SCNC: 113 MMOL/L (ref 98–107)
CO2 SERPL-SCNC: 20 MMOL/L (ref 22–30)
CO2 SERPL-SCNC: 20 MMOL/L (ref 22–30)
EGFR: > 60
EGFR: > 60
ERYTHROCYTE [DISTWIDTH] IN BLOOD BY AUTOMATED COUNT: 12.4 % (ref 11.5–14.5)
ERYTHROCYTE [DISTWIDTH] IN BLOOD BY AUTOMATED COUNT: 12.4 % (ref 11.5–14.5)
ESTIMATED CREATININE CLEARANCE: > 125 ML/MIN
ESTIMATED CREATININE CLEARANCE: > 125 ML/MIN
GLUCOSE - POINT OF CARE: 101 MG/DL (ref 70–99)
GLUCOSE - POINT OF CARE: 170 MG/DL (ref 70–99)
GLUCOSE SERPL-MCNC: 140 MG/DL (ref 70–99)
GLUCOSE SERPL-MCNC: 93 MG/DL (ref 70–99)
HCT VFR BLD AUTO: 33.6 % (ref 37–47)
HCT VFR BLD AUTO: 39.4 % (ref 37–47)
HGB BLD-MCNC: 11.6 G/DL (ref 12–16)
HGB BLD-MCNC: 13.6 G/DL (ref 12–16)
MCHC RBC AUTO-ENTMCNC: 34.5 G/DL (ref 33–37)
MCHC RBC AUTO-ENTMCNC: 34.5 G/DL (ref 33–37)
MCV RBC AUTO: 88.3 FL (ref 81–99)
MCV RBC AUTO: 88.7 FL (ref 81–99)
PLATELET # BLD AUTO: 188 10^3/UL (ref 130–400)
PLATELET # BLD AUTO: 238 10^3/UL (ref 130–400)
POTASSIUM SERPL-SCNC: 4.2 MMOL/L (ref 3.5–5.1)
POTASSIUM SERPL-SCNC: 4.4 MMOL/L (ref 3.5–5.1)
SODIUM SERPL-SCNC: 140 MMOL/L (ref 135–145)
SODIUM SERPL-SCNC: 140 MMOL/L (ref 135–145)
TROPONIN I SERPL-MCNC: < 0.012 NG/ML

## 2025-05-27 RX ADMIN — CHOLECALCIFEROL TAB 125 MCG (5000 UNIT) 125 MCG: 125 TAB at 08:06

## 2025-05-27 RX ADMIN — VALACYCLOVIR HYDROCHLORIDE 500 MG: 500 TABLET, FILM COATED ORAL at 08:06

## 2025-05-27 RX ADMIN — Medication 1 CAPLET: at 10:33

## 2025-05-27 RX ADMIN — BUDESONIDE AND FORMOTEROL FUMARATE DIHYDRATE: 160; 4.5 AEROSOL RESPIRATORY (INHALATION) at 20:33

## 2025-05-27 RX ADMIN — HYDROCORTISONE 15 MG: 20 TABLET ORAL at 03:55

## 2025-05-27 RX ADMIN — HYDROCORTISONE 10 MG: 10 TABLET ORAL at 14:13

## 2025-05-27 RX ADMIN — TIZANIDINE 4 MG: 4 TABLET ORAL at 16:46

## 2025-05-27 RX ADMIN — BUDESONIDE AND FORMOTEROL FUMARATE DIHYDRATE: 160; 4.5 AEROSOL RESPIRATORY (INHALATION) at 07:50

## 2025-05-27 RX ADMIN — LACOSAMIDE 50 MG: 50 TABLET, FILM COATED ORAL at 20:42

## 2025-05-27 RX ADMIN — TIZANIDINE 4 MG: 4 TABLET ORAL at 04:25

## 2025-05-27 RX ADMIN — LACOSAMIDE 50 MG: 50 TABLET, FILM COATED ORAL at 08:10

## 2025-05-27 RX ADMIN — DIVALPROEX SODIUM 500 MG: 500 TABLET, DELAYED RELEASE ORAL at 20:42

## 2025-05-27 RX ADMIN — Medication 0.4 MG: at 08:07

## 2025-05-27 RX ADMIN — DIVALPROEX SODIUM 500 MG: 500 TABLET, DELAYED RELEASE ORAL at 08:06

## 2025-05-27 RX ADMIN — ENOXAPARIN SODIUM 40 MG: 40 INJECTION SUBCUTANEOUS at 18:23

## 2025-05-27 RX ADMIN — SODIUM CHLORIDE 500: 900 INJECTION, SOLUTION INTRAVENOUS at 19:56

## 2025-05-27 RX ADMIN — METOPROLOL TARTRATE 2.5 MG: 1 INJECTION, SOLUTION INTRAVENOUS at 19:46

## 2025-05-27 RX ADMIN — FERROUS SULFATE TAB 325 MG (65 MG ELEMENTAL FE) 325 MG: 325 (65 FE) TAB at 08:10

## 2025-05-27 RX ADMIN — HYDROCORTISONE 5 MG: 10 TABLET ORAL at 20:42

## 2025-05-27 RX ADMIN — SODIUM CHLORIDE 1000: 900 INJECTION, SOLUTION INTRAVENOUS at 22:00

## 2025-05-28 ENCOUNTER — HOSPITAL ENCOUNTER (INPATIENT)
Dept: HOSPITAL 99 - 4 EAST ACU | Age: 30
LOS: 1 days | Discharge: HOME | DRG: 309 | End: 2025-05-29
Payer: COMMERCIAL

## 2025-05-28 VITALS — DIASTOLIC BLOOD PRESSURE: 58 MMHG | SYSTOLIC BLOOD PRESSURE: 98 MMHG

## 2025-05-28 VITALS — DIASTOLIC BLOOD PRESSURE: 82 MMHG | SYSTOLIC BLOOD PRESSURE: 121 MMHG

## 2025-05-28 VITALS — SYSTOLIC BLOOD PRESSURE: 103 MMHG | DIASTOLIC BLOOD PRESSURE: 60 MMHG

## 2025-05-28 VITALS — SYSTOLIC BLOOD PRESSURE: 94 MMHG | DIASTOLIC BLOOD PRESSURE: 54 MMHG

## 2025-05-28 VITALS — SYSTOLIC BLOOD PRESSURE: 104 MMHG | DIASTOLIC BLOOD PRESSURE: 56 MMHG

## 2025-05-28 VITALS — SYSTOLIC BLOOD PRESSURE: 122 MMHG | DIASTOLIC BLOOD PRESSURE: 76 MMHG

## 2025-05-28 VITALS — DIASTOLIC BLOOD PRESSURE: 90 MMHG | SYSTOLIC BLOOD PRESSURE: 143 MMHG

## 2025-05-28 VITALS — SYSTOLIC BLOOD PRESSURE: 127 MMHG | HEART RATE: 111 BPM | DIASTOLIC BLOOD PRESSURE: 84 MMHG

## 2025-05-28 VITALS — SYSTOLIC BLOOD PRESSURE: 108 MMHG | DIASTOLIC BLOOD PRESSURE: 64 MMHG

## 2025-05-28 DIAGNOSIS — Z79.82: ICD-10-CM

## 2025-05-28 DIAGNOSIS — B00.9: ICD-10-CM

## 2025-05-28 DIAGNOSIS — R16.1: ICD-10-CM

## 2025-05-28 DIAGNOSIS — G40.909: ICD-10-CM

## 2025-05-28 DIAGNOSIS — I47.10: Primary | ICD-10-CM

## 2025-05-28 DIAGNOSIS — N31.9: ICD-10-CM

## 2025-05-28 DIAGNOSIS — E27.1: ICD-10-CM

## 2025-05-28 DIAGNOSIS — R07.89: ICD-10-CM

## 2025-05-28 DIAGNOSIS — Z79.899: ICD-10-CM

## 2025-05-28 LAB
BUN SERPL-MCNC: 12 MG/DL (ref 7–17)
CALCIUM SERPL-MCNC: 9.6 MG/DL (ref 8.4–10.2)
CHLORIDE SERPL-SCNC: 109 MMOL/L (ref 98–107)
CO2 SERPL-SCNC: 25 MMOL/L (ref 22–30)
EGFR: > 60
ERYTHROCYTE [DISTWIDTH] IN BLOOD BY AUTOMATED COUNT: 12.6 % (ref 11.5–14.5)
ESTIMATED CREATININE CLEARANCE: > 125 ML/MIN
GLUCOSE SERPL-MCNC: 120 MG/DL (ref 70–99)
HCT VFR BLD AUTO: 36.8 % (ref 37–47)
HGB BLD-MCNC: 12.6 G/DL (ref 12–16)
MAGNESIUM SERPL-MCNC: 1.8 MG/DL (ref 1.6–2.3)
MCHC RBC AUTO-ENTMCNC: 34.2 G/DL (ref 33–37)
MCV RBC AUTO: 89.8 FL (ref 81–99)
PLATELET # BLD AUTO: 249 10^3/UL (ref 130–400)
POTASSIUM SERPL-SCNC: 4.9 MMOL/L (ref 3.5–5.1)
SODIUM SERPL-SCNC: 143 MMOL/L (ref 135–145)
TROPONIN I SERPL-MCNC: < 0.012 NG/ML

## 2025-05-28 RX ADMIN — DILTIAZEM HYDROCHLORIDE 5 MG: 5 INJECTION, SOLUTION INTRAVENOUS at 21:11

## 2025-05-28 RX ADMIN — Medication 1 CAPLET: at 09:11

## 2025-05-28 RX ADMIN — DIVALPROEX SODIUM 500 MG: 500 TABLET, DELAYED RELEASE ORAL at 19:19

## 2025-05-28 RX ADMIN — METOPROLOL TARTRATE 2.5 MG: 1 INJECTION, SOLUTION INTRAVENOUS at 19:48

## 2025-05-28 RX ADMIN — HYDROCORTISONE 10 MG: 10 TABLET ORAL at 14:47

## 2025-05-28 RX ADMIN — LACOSAMIDE 50 MG: 50 TABLET, FILM COATED ORAL at 09:15

## 2025-05-28 RX ADMIN — DILTIAZEM HYDROCHLORIDE 30 MG: 30 TABLET, FILM COATED ORAL at 18:07

## 2025-05-28 RX ADMIN — BUDESONIDE AND FORMOTEROL FUMARATE DIHYDRATE: 160; 4.5 AEROSOL RESPIRATORY (INHALATION) at 08:28

## 2025-05-28 RX ADMIN — ENOXAPARIN SODIUM 40 MG: 40 INJECTION SUBCUTANEOUS at 17:05

## 2025-05-28 RX ADMIN — LACOSAMIDE 50 MG: 50 TABLET, FILM COATED ORAL at 19:19

## 2025-05-28 RX ADMIN — METOPROLOL TARTRATE 12.5 MG: 25 TABLET, FILM COATED ORAL at 11:25

## 2025-05-28 RX ADMIN — METOPROLOL TARTRATE 2.5 MG: 1 INJECTION, SOLUTION INTRAVENOUS at 10:34

## 2025-05-28 RX ADMIN — HYDROCORTISONE SODIUM SUCCINATE 100 MG: 100 INJECTION, POWDER, FOR SOLUTION INTRAMUSCULAR; INTRAVENOUS at 11:48

## 2025-05-28 RX ADMIN — HYDROCORTISONE 5 MG: 10 TABLET ORAL at 19:19

## 2025-05-28 RX ADMIN — FERROUS SULFATE TAB 325 MG (65 MG ELEMENTAL FE) 325 MG: 325 (65 FE) TAB at 09:15

## 2025-05-28 RX ADMIN — DIVALPROEX SODIUM 500 MG: 500 TABLET, DELAYED RELEASE ORAL at 09:10

## 2025-05-28 RX ADMIN — Medication 0.4 MG: at 09:14

## 2025-05-28 RX ADMIN — BUDESONIDE AND FORMOTEROL FUMARATE DIHYDRATE 2 PUFF: 160; 4.5 AEROSOL RESPIRATORY (INHALATION) at 19:50

## 2025-05-28 RX ADMIN — HYDROCORTISONE 15 MG: 20 TABLET ORAL at 04:31

## 2025-05-28 RX ADMIN — VALACYCLOVIR HYDROCHLORIDE 500 MG: 500 TABLET, FILM COATED ORAL at 09:10

## 2025-05-28 RX ADMIN — SODIUM CHLORIDE: 900 INJECTION, SOLUTION INTRAVENOUS at 10:45

## 2025-05-28 RX ADMIN — CHOLECALCIFEROL TAB 125 MCG (5000 UNIT) 125 MCG: 125 TAB at 09:15

## 2025-05-29 VITALS — SYSTOLIC BLOOD PRESSURE: 111 MMHG | HEART RATE: 71 BPM | OXYGEN SATURATION: 98 % | DIASTOLIC BLOOD PRESSURE: 69 MMHG

## 2025-05-29 VITALS — DIASTOLIC BLOOD PRESSURE: 62 MMHG | SYSTOLIC BLOOD PRESSURE: 114 MMHG | HEART RATE: 92 BPM

## 2025-05-29 VITALS — SYSTOLIC BLOOD PRESSURE: 93 MMHG | DIASTOLIC BLOOD PRESSURE: 51 MMHG

## 2025-05-29 VITALS — DIASTOLIC BLOOD PRESSURE: 46 MMHG | SYSTOLIC BLOOD PRESSURE: 86 MMHG

## 2025-05-29 VITALS — DIASTOLIC BLOOD PRESSURE: 62 MMHG | SYSTOLIC BLOOD PRESSURE: 114 MMHG

## 2025-05-29 VITALS — DIASTOLIC BLOOD PRESSURE: 65 MMHG | SYSTOLIC BLOOD PRESSURE: 108 MMHG

## 2025-05-29 VITALS — HEART RATE: 87 BPM

## 2025-05-29 RX ADMIN — DILTIAZEM HYDROCHLORIDE 120 MG: 120 CAPSULE, EXTENDED RELEASE ORAL at 09:24

## 2025-05-29 RX ADMIN — DIVALPROEX SODIUM 500 MG: 500 TABLET, DELAYED RELEASE ORAL at 09:24

## 2025-05-29 RX ADMIN — Medication 1 CAPLET: at 09:24

## 2025-05-29 RX ADMIN — SODIUM CHLORIDE 500: 900 INJECTION, SOLUTION INTRAVENOUS at 10:46

## 2025-05-29 RX ADMIN — FERROUS SULFATE TAB 325 MG (65 MG ELEMENTAL FE) 325 MG: 325 (65 FE) TAB at 09:24

## 2025-05-29 RX ADMIN — CHOLECALCIFEROL TAB 125 MCG (5000 UNIT) 125 MCG: 125 TAB at 09:24

## 2025-05-29 RX ADMIN — LACOSAMIDE 50 MG: 50 TABLET, FILM COATED ORAL at 09:23

## 2025-05-29 RX ADMIN — HYDROCORTISONE 10 MG: 10 TABLET ORAL at 14:25

## 2025-05-29 RX ADMIN — HYDROCORTISONE 15 MG: 20 TABLET ORAL at 03:02

## 2025-05-29 RX ADMIN — VALACYCLOVIR HYDROCHLORIDE 500 MG: 500 TABLET, FILM COATED ORAL at 09:24

## 2025-05-29 RX ADMIN — BUDESONIDE AND FORMOTEROL FUMARATE DIHYDRATE: 160; 4.5 AEROSOL RESPIRATORY (INHALATION) at 08:12

## 2025-05-29 RX ADMIN — Medication 0.4 MG: at 09:23
